# Patient Record
Sex: MALE | Race: WHITE | NOT HISPANIC OR LATINO | Employment: FULL TIME | URBAN - METROPOLITAN AREA
[De-identification: names, ages, dates, MRNs, and addresses within clinical notes are randomized per-mention and may not be internally consistent; named-entity substitution may affect disease eponyms.]

---

## 2024-03-06 ENCOUNTER — TRANSFERRED RECORDS (OUTPATIENT)
Dept: HEALTH INFORMATION MANAGEMENT | Facility: CLINIC | Age: 79
End: 2024-03-06
Payer: COMMERCIAL

## 2024-03-06 ENCOUNTER — MEDICAL CORRESPONDENCE (OUTPATIENT)
Dept: HEALTH INFORMATION MANAGEMENT | Facility: CLINIC | Age: 79
End: 2024-03-06
Payer: COMMERCIAL

## 2024-03-06 DIAGNOSIS — R06.02 SOB (SHORTNESS OF BREATH): ICD-10-CM

## 2024-03-06 DIAGNOSIS — Z01.818 PREOPERATIVE CLEARANCE: ICD-10-CM

## 2024-03-06 DIAGNOSIS — R01.1 NEWLY RECOGNIZED HEART MURMUR: Primary | ICD-10-CM

## 2024-03-06 DIAGNOSIS — F34.1 DYSTHYMIA: ICD-10-CM

## 2024-03-14 ENCOUNTER — HOSPITAL ENCOUNTER (OUTPATIENT)
Dept: CARDIOLOGY | Facility: CLINIC | Age: 79
Discharge: HOME | End: 2024-03-14
Attending: FAMILY MEDICINE | Admitting: FAMILY MEDICINE
Payer: COMMERCIAL

## 2024-03-14 DIAGNOSIS — R06.02 SOB (SHORTNESS OF BREATH): ICD-10-CM

## 2024-03-14 DIAGNOSIS — Z01.818 PREOPERATIVE CLEARANCE: ICD-10-CM

## 2024-03-14 DIAGNOSIS — R01.1 NEWLY RECOGNIZED HEART MURMUR: ICD-10-CM

## 2024-03-14 DIAGNOSIS — F34.1 DYSTHYMIA: ICD-10-CM

## 2024-03-14 PROCEDURE — 93018 CV STRESS TEST I&R ONLY: CPT | Performed by: INTERNAL MEDICINE

## 2024-03-14 PROCEDURE — 93321 DOPPLER ECHO F-UP/LMTD STD: CPT | Mod: 26 | Performed by: INTERNAL MEDICINE

## 2024-03-14 PROCEDURE — 93325 DOPPLER ECHO COLOR FLOW MAPG: CPT | Mod: 26 | Performed by: INTERNAL MEDICINE

## 2024-03-14 PROCEDURE — 93325 DOPPLER ECHO COLOR FLOW MAPG: CPT | Mod: TC

## 2024-03-14 PROCEDURE — 255N000002 HC RX 255 OP 636: Performed by: INTERNAL MEDICINE

## 2024-03-14 PROCEDURE — 93016 CV STRESS TEST SUPVJ ONLY: CPT | Performed by: INTERNAL MEDICINE

## 2024-03-14 PROCEDURE — 250N000009 HC RX 250: Performed by: INTERNAL MEDICINE

## 2024-03-14 PROCEDURE — 250N000011 HC RX IP 250 OP 636: Performed by: INTERNAL MEDICINE

## 2024-03-14 PROCEDURE — 93350 STRESS TTE ONLY: CPT | Mod: 26 | Performed by: INTERNAL MEDICINE

## 2024-03-14 RX ORDER — ATROPINE SULFATE 0.4 MG/ML
.2-2 AMPUL (ML) INJECTION
Status: DISCONTINUED | OUTPATIENT
Start: 2024-03-14 | End: 2024-03-15 | Stop reason: HOSPADM

## 2024-03-14 RX ORDER — METOPROLOL TARTRATE 1 MG/ML
1-20 INJECTION, SOLUTION INTRAVENOUS
Status: ACTIVE | OUTPATIENT
Start: 2024-03-14 | End: 2024-03-14

## 2024-03-14 RX ORDER — DOBUTAMINE HYDROCHLORIDE 200 MG/100ML
10-50 INJECTION INTRAVENOUS CONTINUOUS
Status: SHIPPED | OUTPATIENT
Start: 2024-03-14 | End: 2024-03-14

## 2024-03-14 RX ORDER — SODIUM CHLORIDE 9 MG/ML
INJECTION, SOLUTION INTRAVENOUS CONTINUOUS
Status: ACTIVE | OUTPATIENT
Start: 2024-03-14 | End: 2024-03-14

## 2024-03-14 RX ADMIN — METOPROLOL TARTRATE 2 MG: 5 INJECTION INTRAVENOUS at 08:33

## 2024-03-14 RX ADMIN — PERFLUTREN 6.5 ML: 6.52 INJECTION, SUSPENSION INTRAVENOUS at 08:33

## 2024-03-14 RX ADMIN — DOBUTAMINE IN DEXTROSE 10 MCG/KG/MIN: 200 INJECTION, SOLUTION INTRAVENOUS at 08:18

## 2024-03-14 NOTE — PROGRESS NOTES
Pt here for dobutamine stress test. Test, meds and side effects reviewed with patient. Achieved target HR at 40 mcg Dobutamine. Gave a total of 2 mg IV Metoprolol to bring HR back to baseline. Post monitoring complete and VSS. Pt escorted out to the gold waiting room.

## 2024-04-12 RX ORDER — MULTIPLE VITAMINS W/ MINERALS TAB 9MG-400MCG
1 TAB ORAL DAILY
COMMUNITY

## 2024-04-12 RX ORDER — PRAMIPEXOLE DIHYDROCHLORIDE 0.25 MG/1
0.25 TABLET ORAL AT BEDTIME
COMMUNITY

## 2024-04-12 RX ORDER — CALCIUM CARBONATE 500(1250)
1 TABLET ORAL DAILY
COMMUNITY
End: 2024-04-15

## 2024-04-12 RX ORDER — HYDROCHLOROTHIAZIDE 25 MG/1
25 TABLET ORAL DAILY
COMMUNITY

## 2024-04-12 RX ORDER — AMOXICILLIN 500 MG
4800 CAPSULE ORAL DAILY
COMMUNITY

## 2024-04-12 RX ORDER — LISINOPRIL 40 MG/1
40 TABLET ORAL DAILY
COMMUNITY

## 2024-04-12 RX ORDER — ASPIRIN 81 MG/1
81 TABLET ORAL DAILY
Status: ON HOLD | COMMUNITY
End: 2024-04-16

## 2024-04-12 RX ORDER — FLUTICASONE PROPIONATE 50 MCG
2 SPRAY, SUSPENSION (ML) NASAL AT BEDTIME
COMMUNITY

## 2024-04-12 RX ORDER — LATANOPROST 50 UG/ML
1 SOLUTION/ DROPS OPHTHALMIC AT BEDTIME
COMMUNITY

## 2024-04-12 RX ORDER — MELOXICAM 7.5 MG/1
7.5 TABLET ORAL 2 TIMES DAILY
Status: ON HOLD | COMMUNITY
End: 2024-04-16

## 2024-04-15 ENCOUNTER — ANESTHESIA EVENT (OUTPATIENT)
Dept: SURGERY | Facility: CLINIC | Age: 79
End: 2024-04-15
Payer: COMMERCIAL

## 2024-04-15 RX ORDER — LEVOTHYROXINE SODIUM 50 UG/1
1 TABLET ORAL
COMMUNITY
Start: 2024-01-01 | End: 2024-04-15

## 2024-04-15 NOTE — PROGRESS NOTES
PTA medications updated by Medication Scribe prior to surgery via phone call with patient (last doses completed by Nurse)     Medication history sources: Patient and H&P  In the past week, patient estimated taking medication this percent of the time: Greater than 90%      Significant changes made to the medication list:  Patient reports no longer taking the following meds (med scribe removed from PTA med list): Os-DEIDRE, Depakote      Additional medication history information:   Patient was advised to bring: Flonase nasal spray, latanoprost OP Soln.    Medication reconciliation completed by provider prior to medication history? No    Time spent in this activity: 40 minutes    The information provided in this note is only as accurate as the sources available at the time of update(s)      Prior to Admission medications    Medication Sig Last Dose Taking? Auth Provider Long Term End Date   aspirin 81 MG EC tablet Take 81 mg by mouth daily 4/9/2024 at am Yes Reported, Patient     ATORVASTATIN CALCIUM PO Take 20 mg by mouth at bedtime 4/15/2024 at PM Yes Reported, Patient     Calcium-Magnesium-Zinc 333-133-5 MG TABS per tablet Take 1 tablet by mouth daily 4/9/2024 at am Yes Reported, Patient     fluticasone (FLONASE) 50 MCG/ACT nasal spray Spray 2 sprays into both nostrils at bedtime 4/15/2024 at PM Yes Reported, Patient     hydrochlorothiazide (HYDRODIURIL) 25 MG tablet Take 25 mg by mouth daily 4/15/2024 at am Yes Reported, Patient Yes    latanoprost (XALATAN) 0.005 % ophthalmic solution Place 1 drop into both eyes at bedtime 4/15/2024 at pm Yes Reported, Patient Yes    Levothyroxine Sodium (SYNTHROID PO) Take 50 mcg by mouth daily  at am Yes Reported, Patient     lisinopril (ZESTRIL) 40 MG tablet Take 40 mg by mouth daily 4/15/2024 at am Yes Reported, Patient Yes    meloxicam (MOBIC) 7.5 MG tablet Take 7.5 mg by mouth 2 times daily 4/9/2024 at AM Yes Reported, Patient Yes    Multiple Vitamins-Minerals (PRESERVISION  AREDS 2 PO) Take 2 tablets by mouth daily 4/15/2024 at am Yes Reported, Patient     multivitamin w/minerals (THERA-VIT-M) tablet Take 1 tablet by mouth daily 4/9/2024 at am Yes Reported, Patient     Omega-3 Fatty Acids (FISH OIL) 1200 MG capsule Take 4,800 mg by mouth daily 4/9/2024 at PM Yes Reported, Patient     pramipexole (MIRAPEX) 0.25 MG tablet Take 0.25 mg by mouth at bedtime 4/15/2024 at pm Yes Reported, Patient Yes        Medication history completed by: Gisela Bee LPN

## 2024-04-16 ENCOUNTER — ANESTHESIA (OUTPATIENT)
Dept: SURGERY | Facility: CLINIC | Age: 79
End: 2024-04-16
Payer: COMMERCIAL

## 2024-04-16 ENCOUNTER — HOSPITAL ENCOUNTER (OUTPATIENT)
Facility: CLINIC | Age: 79
Discharge: HOME | End: 2024-04-17
Attending: ORTHOPAEDIC SURGERY | Admitting: ORTHOPAEDIC SURGERY
Payer: COMMERCIAL

## 2024-04-16 ENCOUNTER — APPOINTMENT (OUTPATIENT)
Dept: GENERAL RADIOLOGY | Facility: CLINIC | Age: 79
End: 2024-04-16
Attending: ORTHOPAEDIC SURGERY
Payer: COMMERCIAL

## 2024-04-16 ENCOUNTER — APPOINTMENT (OUTPATIENT)
Dept: PHYSICAL THERAPY | Facility: CLINIC | Age: 79
End: 2024-04-16
Attending: ORTHOPAEDIC SURGERY
Payer: COMMERCIAL

## 2024-04-16 DIAGNOSIS — R33.9 URINARY RETENTION: ICD-10-CM

## 2024-04-16 DIAGNOSIS — Z96.651 S/P TOTAL KNEE ARTHROPLASTY, RIGHT: Primary | ICD-10-CM

## 2024-04-16 LAB
CREAT SERPL-MCNC: 1.07 MG/DL (ref 0.67–1.17)
EGFRCR SERPLBLD CKD-EPI 2021: 71 ML/MIN/1.73M2
FASTING STATUS PATIENT QL REPORTED: YES
GLUCOSE SERPL-MCNC: 105 MG/DL (ref 70–99)
POTASSIUM SERPL-SCNC: 4.1 MMOL/L (ref 3.4–5.3)

## 2024-04-16 PROCEDURE — 999N000065 XR KNEE PORT RIGHT 1/2 VIEWS: Mod: RT

## 2024-04-16 PROCEDURE — 272N000001 HC OR GENERAL SUPPLY STERILE: Performed by: ORTHOPAEDIC SURGERY

## 2024-04-16 PROCEDURE — 250N000011 HC RX IP 250 OP 636: Performed by: ORTHOPAEDIC SURGERY

## 2024-04-16 PROCEDURE — 250N000009 HC RX 250: Performed by: NURSE ANESTHETIST, CERTIFIED REGISTERED

## 2024-04-16 PROCEDURE — 36415 COLL VENOUS BLD VENIPUNCTURE: CPT | Performed by: ANESTHESIOLOGY

## 2024-04-16 PROCEDURE — 82947 ASSAY GLUCOSE BLOOD QUANT: CPT | Mod: 91 | Performed by: ANESTHESIOLOGY

## 2024-04-16 PROCEDURE — 84132 ASSAY OF SERUM POTASSIUM: CPT | Performed by: ANESTHESIOLOGY

## 2024-04-16 PROCEDURE — 250N000013 HC RX MED GY IP 250 OP 250 PS 637: Performed by: PHYSICIAN ASSISTANT

## 2024-04-16 PROCEDURE — 258N000003 HC RX IP 258 OP 636: Performed by: PHYSICIAN ASSISTANT

## 2024-04-16 PROCEDURE — 27447 TOTAL KNEE ARTHROPLASTY: CPT | Performed by: ANESTHESIOLOGY

## 2024-04-16 PROCEDURE — 99100 ANES PT EXTEME AGE<1 YR&>70: CPT | Performed by: NURSE ANESTHETIST, CERTIFIED REGISTERED

## 2024-04-16 PROCEDURE — C1713 ANCHOR/SCREW BN/BN,TIS/BN: HCPCS | Performed by: ORTHOPAEDIC SURGERY

## 2024-04-16 PROCEDURE — 710N000009 HC RECOVERY PHASE 1, LEVEL 1, PER MIN: Performed by: ORTHOPAEDIC SURGERY

## 2024-04-16 PROCEDURE — 250N000011 HC RX IP 250 OP 636: Performed by: PHYSICIAN ASSISTANT

## 2024-04-16 PROCEDURE — 250N000009 HC RX 250: Performed by: ORTHOPAEDIC SURGERY

## 2024-04-16 PROCEDURE — C1776 JOINT DEVICE (IMPLANTABLE): HCPCS | Performed by: ORTHOPAEDIC SURGERY

## 2024-04-16 PROCEDURE — 250N000009 HC RX 250: Performed by: ANESTHESIOLOGY

## 2024-04-16 PROCEDURE — 97530 THERAPEUTIC ACTIVITIES: CPT | Mod: GP

## 2024-04-16 PROCEDURE — 250N000011 HC RX IP 250 OP 636: Performed by: NURSE ANESTHETIST, CERTIFIED REGISTERED

## 2024-04-16 PROCEDURE — 370N000017 HC ANESTHESIA TECHNICAL FEE, PER MIN: Performed by: ORTHOPAEDIC SURGERY

## 2024-04-16 PROCEDURE — 258N000003 HC RX IP 258 OP 636: Performed by: NURSE ANESTHETIST, CERTIFIED REGISTERED

## 2024-04-16 PROCEDURE — 258N000003 HC RX IP 258 OP 636: Performed by: ANESTHESIOLOGY

## 2024-04-16 PROCEDURE — 258N000001 HC RX 258: Performed by: ORTHOPAEDIC SURGERY

## 2024-04-16 PROCEDURE — 258N000003 HC RX IP 258 OP 636: Performed by: ORTHOPAEDIC SURGERY

## 2024-04-16 PROCEDURE — 97161 PT EVAL LOW COMPLEX 20 MIN: CPT | Mod: GP

## 2024-04-16 PROCEDURE — 99205 OFFICE O/P NEW HI 60 MIN: CPT | Performed by: HOSPITALIST

## 2024-04-16 PROCEDURE — 27447 TOTAL KNEE ARTHROPLASTY: CPT | Performed by: NURSE ANESTHETIST, CERTIFIED REGISTERED

## 2024-04-16 PROCEDURE — 360N000077 HC SURGERY LEVEL 4, PER MIN: Performed by: ORTHOPAEDIC SURGERY

## 2024-04-16 PROCEDURE — 97116 GAIT TRAINING THERAPY: CPT | Mod: GP

## 2024-04-16 PROCEDURE — 82565 ASSAY OF CREATININE: CPT | Performed by: ORTHOPAEDIC SURGERY

## 2024-04-16 PROCEDURE — 999N000141 HC STATISTIC PRE-PROCEDURE NURSING ASSESSMENT: Performed by: ORTHOPAEDIC SURGERY

## 2024-04-16 PROCEDURE — 250N000013 HC RX MED GY IP 250 OP 250 PS 637: Performed by: ORTHOPAEDIC SURGERY

## 2024-04-16 DEVICE — SIMPLEX® HV IS A FAST-SETTING ACRYLIC RESIN FOR USE IN BONE SURGERY. MIXING THE TWO SEPARATE STERILE COMPONENTS PRODUCES A DUCTILE BONE CEMENT WHICH, AFTER HARDENING, FIXES THE IMPLANT AND TRANSFERS STRESSES PRODUCED DURING MOVEMENT EVENLY TO THE BONE. SIMPLEX® HV CEMENT POWDER ALSO CONTAINS INSOLUBLE ZIRCONIUM DIOXIDE AS AN X-RAY CONTRAST MEDIUM. SIMPLEX® HV DOES NOT EMIT A SIGNAL AND DOES NOT POSE A SAFETY RISK IN A MAGNETIC RESONANCE ENVIRONMENT.
Type: IMPLANTABLE DEVICE | Site: KNEE | Status: FUNCTIONAL
Brand: SIMPLEX HV

## 2024-04-16 DEVICE — LEGION POSTERIOR STABILIZED HIGH                                    FLEX HIGHLY CROSS LINKED                                    POLYETHYLENE SIZE 7-8 11MM
Type: IMPLANTABLE DEVICE | Site: KNEE | Status: FUNCTIONAL
Brand: LEGION

## 2024-04-16 DEVICE — GEN II RESURFACING PATELLA 38MM
Type: IMPLANTABLE DEVICE | Site: KNEE | Status: FUNCTIONAL
Brand: GENESIS II

## 2024-04-16 DEVICE — GENESIS II NON-POROUS TIBIAL                                    BASEPLATE SIZE 7 RIGHT
Type: IMPLANTABLE DEVICE | Site: KNEE | Status: FUNCTIONAL
Brand: GENESIS II

## 2024-04-16 DEVICE — LEGION POSTERIOR STABILIZED                                    NONPOROUS FEMORAL SIZE 7 RIGHT
Type: IMPLANTABLE DEVICE | Site: KNEE | Status: FUNCTIONAL
Brand: LEGION

## 2024-04-16 RX ORDER — PRAMIPEXOLE DIHYDROCHLORIDE 0.25 MG/1
0.25 TABLET ORAL AT BEDTIME
Status: DISCONTINUED | OUTPATIENT
Start: 2024-04-16 | End: 2024-04-17 | Stop reason: HOSPADM

## 2024-04-16 RX ORDER — NALOXONE HYDROCHLORIDE 0.4 MG/ML
0.4 INJECTION, SOLUTION INTRAMUSCULAR; INTRAVENOUS; SUBCUTANEOUS
Status: DISCONTINUED | OUTPATIENT
Start: 2024-04-16 | End: 2024-04-17 | Stop reason: HOSPADM

## 2024-04-16 RX ORDER — HYDROMORPHONE HYDROCHLORIDE 2 MG/1
4 TABLET ORAL EVERY 4 HOURS PRN
Status: DISCONTINUED | OUTPATIENT
Start: 2024-04-16 | End: 2024-04-17 | Stop reason: HOSPADM

## 2024-04-16 RX ORDER — FLUTICASONE PROPIONATE 50 MCG
2 SPRAY, SUSPENSION (ML) NASAL AT BEDTIME
Status: DISCONTINUED | OUTPATIENT
Start: 2024-04-16 | End: 2024-04-17 | Stop reason: HOSPADM

## 2024-04-16 RX ORDER — GLYCOPYRROLATE 0.2 MG/ML
INJECTION, SOLUTION INTRAMUSCULAR; INTRAVENOUS PRN
Status: DISCONTINUED | OUTPATIENT
Start: 2024-04-16 | End: 2024-04-16

## 2024-04-16 RX ORDER — HYDRALAZINE HYDROCHLORIDE 20 MG/ML
2.5-5 INJECTION INTRAMUSCULAR; INTRAVENOUS
Status: DISCONTINUED | OUTPATIENT
Start: 2024-04-16 | End: 2024-04-16 | Stop reason: HOSPADM

## 2024-04-16 RX ORDER — HYDROMORPHONE HCL IN WATER/PF 6 MG/30 ML
0.2 PATIENT CONTROLLED ANALGESIA SYRINGE INTRAVENOUS EVERY 5 MIN PRN
Status: DISCONTINUED | OUTPATIENT
Start: 2024-04-16 | End: 2024-04-16 | Stop reason: HOSPADM

## 2024-04-16 RX ORDER — PROPOFOL 10 MG/ML
INJECTION, EMULSION INTRAVENOUS CONTINUOUS PRN
Status: DISCONTINUED | OUTPATIENT
Start: 2024-04-16 | End: 2024-04-16

## 2024-04-16 RX ORDER — BISACODYL 10 MG
10 SUPPOSITORY, RECTAL RECTAL DAILY PRN
Status: DISCONTINUED | OUTPATIENT
Start: 2024-04-19 | End: 2024-04-17 | Stop reason: HOSPADM

## 2024-04-16 RX ORDER — HYDROCHLOROTHIAZIDE 25 MG/1
25 TABLET ORAL DAILY
Status: DISCONTINUED | OUTPATIENT
Start: 2024-04-17 | End: 2024-04-17 | Stop reason: HOSPADM

## 2024-04-16 RX ORDER — ASPIRIN 81 MG/1
81 TABLET ORAL 2 TIMES DAILY
Status: DISCONTINUED | OUTPATIENT
Start: 2024-04-16 | End: 2024-04-17 | Stop reason: HOSPADM

## 2024-04-16 RX ORDER — HYDROMORPHONE HYDROCHLORIDE 2 MG/1
2 TABLET ORAL EVERY 4 HOURS PRN
Status: DISCONTINUED | OUTPATIENT
Start: 2024-04-16 | End: 2024-04-17 | Stop reason: HOSPADM

## 2024-04-16 RX ORDER — AMOXICILLIN 250 MG
1-2 CAPSULE ORAL 2 TIMES DAILY
Qty: 30 TABLET | Refills: 0 | Status: SHIPPED | OUTPATIENT
Start: 2024-04-16 | End: 2024-05-17

## 2024-04-16 RX ORDER — ONDANSETRON 4 MG/1
4 TABLET, ORALLY DISINTEGRATING ORAL EVERY 6 HOURS PRN
Status: DISCONTINUED | OUTPATIENT
Start: 2024-04-16 | End: 2024-04-17 | Stop reason: HOSPADM

## 2024-04-16 RX ORDER — SODIUM CHLORIDE, SODIUM LACTATE, POTASSIUM CHLORIDE, CALCIUM CHLORIDE 600; 310; 30; 20 MG/100ML; MG/100ML; MG/100ML; MG/100ML
INJECTION, SOLUTION INTRAVENOUS CONTINUOUS
Status: DISCONTINUED | OUTPATIENT
Start: 2024-04-16 | End: 2024-04-16 | Stop reason: HOSPADM

## 2024-04-16 RX ORDER — ONDANSETRON 2 MG/ML
4 INJECTION INTRAMUSCULAR; INTRAVENOUS EVERY 6 HOURS PRN
Status: DISCONTINUED | OUTPATIENT
Start: 2024-04-16 | End: 2024-04-17 | Stop reason: HOSPADM

## 2024-04-16 RX ORDER — ATORVASTATIN CALCIUM 20 MG/1
20 TABLET, FILM COATED ORAL AT BEDTIME
Status: DISCONTINUED | OUTPATIENT
Start: 2024-04-16 | End: 2024-04-17 | Stop reason: HOSPADM

## 2024-04-16 RX ORDER — FENTANYL CITRATE 0.05 MG/ML
25 INJECTION, SOLUTION INTRAMUSCULAR; INTRAVENOUS EVERY 5 MIN PRN
Status: DISCONTINUED | OUTPATIENT
Start: 2024-04-16 | End: 2024-04-16 | Stop reason: HOSPADM

## 2024-04-16 RX ORDER — LIDOCAINE 40 MG/G
CREAM TOPICAL
Status: DISCONTINUED | OUTPATIENT
Start: 2024-04-16 | End: 2024-04-17 | Stop reason: HOSPADM

## 2024-04-16 RX ORDER — DEXAMETHASONE SODIUM PHOSPHATE 4 MG/ML
INJECTION, SOLUTION INTRA-ARTICULAR; INTRALESIONAL; INTRAMUSCULAR; INTRAVENOUS; SOFT TISSUE PRN
Status: DISCONTINUED | OUTPATIENT
Start: 2024-04-16 | End: 2024-04-16

## 2024-04-16 RX ORDER — MAGNESIUM HYDROXIDE 1200 MG/15ML
LIQUID ORAL PRN
Status: DISCONTINUED | OUTPATIENT
Start: 2024-04-16 | End: 2024-04-16 | Stop reason: HOSPADM

## 2024-04-16 RX ORDER — ACETAMINOPHEN 325 MG/1
650 TABLET ORAL EVERY 4 HOURS PRN
Status: DISCONTINUED | OUTPATIENT
Start: 2024-04-19 | End: 2024-04-17 | Stop reason: HOSPADM

## 2024-04-16 RX ORDER — CEFAZOLIN SODIUM/WATER 3 G/30 ML
3 SYRINGE (ML) INTRAVENOUS
Status: COMPLETED | OUTPATIENT
Start: 2024-04-16 | End: 2024-04-16

## 2024-04-16 RX ORDER — NALOXONE HYDROCHLORIDE 0.4 MG/ML
0.2 INJECTION, SOLUTION INTRAMUSCULAR; INTRAVENOUS; SUBCUTANEOUS
Status: DISCONTINUED | OUTPATIENT
Start: 2024-04-16 | End: 2024-04-17 | Stop reason: HOSPADM

## 2024-04-16 RX ORDER — TRANEXAMIC ACID 650 MG/1
1950 TABLET ORAL ONCE
Status: COMPLETED | OUTPATIENT
Start: 2024-04-16 | End: 2024-04-16

## 2024-04-16 RX ORDER — LABETALOL HYDROCHLORIDE 5 MG/ML
5 INJECTION, SOLUTION INTRAVENOUS
Status: DISCONTINUED | OUTPATIENT
Start: 2024-04-16 | End: 2024-04-16 | Stop reason: HOSPADM

## 2024-04-16 RX ORDER — ONDANSETRON 4 MG/1
4 TABLET, ORALLY DISINTEGRATING ORAL EVERY 30 MIN PRN
Status: DISCONTINUED | OUTPATIENT
Start: 2024-04-16 | End: 2024-04-16 | Stop reason: HOSPADM

## 2024-04-16 RX ORDER — ONDANSETRON 2 MG/ML
INJECTION INTRAMUSCULAR; INTRAVENOUS PRN
Status: DISCONTINUED | OUTPATIENT
Start: 2024-04-16 | End: 2024-04-16

## 2024-04-16 RX ORDER — BUPIVACAINE HYDROCHLORIDE 7.5 MG/ML
INJECTION, SOLUTION INTRASPINAL PRN
Status: DISCONTINUED | OUTPATIENT
Start: 2024-04-16 | End: 2024-04-16

## 2024-04-16 RX ORDER — LISINOPRIL 40 MG/1
40 TABLET ORAL DAILY
Status: DISCONTINUED | OUTPATIENT
Start: 2024-04-16 | End: 2024-04-16

## 2024-04-16 RX ORDER — NALOXONE HYDROCHLORIDE 0.4 MG/ML
0.1 INJECTION, SOLUTION INTRAMUSCULAR; INTRAVENOUS; SUBCUTANEOUS
Status: DISCONTINUED | OUTPATIENT
Start: 2024-04-16 | End: 2024-04-16 | Stop reason: HOSPADM

## 2024-04-16 RX ORDER — FENTANYL CITRATE 0.05 MG/ML
50 INJECTION, SOLUTION INTRAMUSCULAR; INTRAVENOUS EVERY 5 MIN PRN
Status: DISCONTINUED | OUTPATIENT
Start: 2024-04-16 | End: 2024-04-16 | Stop reason: HOSPADM

## 2024-04-16 RX ORDER — HYDROMORPHONE HCL IN WATER/PF 6 MG/30 ML
0.4 PATIENT CONTROLLED ANALGESIA SYRINGE INTRAVENOUS
Status: DISCONTINUED | OUTPATIENT
Start: 2024-04-16 | End: 2024-04-17 | Stop reason: HOSPADM

## 2024-04-16 RX ORDER — AMOXICILLIN 250 MG
1 CAPSULE ORAL 2 TIMES DAILY
Status: DISCONTINUED | OUTPATIENT
Start: 2024-04-16 | End: 2024-04-17 | Stop reason: HOSPADM

## 2024-04-16 RX ORDER — LATANOPROST 50 UG/ML
1 SOLUTION/ DROPS OPHTHALMIC AT BEDTIME
Status: DISCONTINUED | OUTPATIENT
Start: 2024-04-16 | End: 2024-04-17 | Stop reason: HOSPADM

## 2024-04-16 RX ORDER — HYDROXYZINE HYDROCHLORIDE 10 MG/1
10 TABLET, FILM COATED ORAL EVERY 6 HOURS PRN
Status: DISCONTINUED | OUTPATIENT
Start: 2024-04-16 | End: 2024-04-17 | Stop reason: HOSPADM

## 2024-04-16 RX ORDER — HYDROMORPHONE HCL IN WATER/PF 6 MG/30 ML
0.4 PATIENT CONTROLLED ANALGESIA SYRINGE INTRAVENOUS EVERY 5 MIN PRN
Status: DISCONTINUED | OUTPATIENT
Start: 2024-04-16 | End: 2024-04-16 | Stop reason: HOSPADM

## 2024-04-16 RX ORDER — HYDROMORPHONE HYDROCHLORIDE 2 MG/1
2-4 TABLET ORAL EVERY 4 HOURS PRN
Qty: 31 TABLET | Refills: 0 | Status: SHIPPED | OUTPATIENT
Start: 2024-04-16 | End: 2024-05-17

## 2024-04-16 RX ORDER — HYDROCHLOROTHIAZIDE 25 MG/1
25 TABLET ORAL DAILY
Status: DISCONTINUED | OUTPATIENT
Start: 2024-04-16 | End: 2024-04-16

## 2024-04-16 RX ORDER — ONDANSETRON 2 MG/ML
4 INJECTION INTRAMUSCULAR; INTRAVENOUS EVERY 30 MIN PRN
Status: DISCONTINUED | OUTPATIENT
Start: 2024-04-16 | End: 2024-04-16 | Stop reason: HOSPADM

## 2024-04-16 RX ORDER — PROPOFOL 10 MG/ML
INJECTION, EMULSION INTRAVENOUS PRN
Status: DISCONTINUED | OUTPATIENT
Start: 2024-04-16 | End: 2024-04-16

## 2024-04-16 RX ORDER — CEFAZOLIN SODIUM 2 G/100ML
2 INJECTION, SOLUTION INTRAVENOUS EVERY 8 HOURS
Qty: 200 ML | Refills: 0 | Status: COMPLETED | OUTPATIENT
Start: 2024-04-16 | End: 2024-04-17

## 2024-04-16 RX ORDER — LEVOTHYROXINE SODIUM 50 UG/1
50 TABLET ORAL DAILY
Status: DISCONTINUED | OUTPATIENT
Start: 2024-04-17 | End: 2024-04-17 | Stop reason: HOSPADM

## 2024-04-16 RX ORDER — ASPIRIN 81 MG/1
81 TABLET ORAL 2 TIMES DAILY
Qty: 60 TABLET | Refills: 0 | Status: SHIPPED | OUTPATIENT
Start: 2024-04-16

## 2024-04-16 RX ORDER — PROCHLORPERAZINE MALEATE 5 MG
5 TABLET ORAL EVERY 6 HOURS PRN
Status: DISCONTINUED | OUTPATIENT
Start: 2024-04-16 | End: 2024-04-17 | Stop reason: HOSPADM

## 2024-04-16 RX ORDER — SODIUM CHLORIDE, SODIUM LACTATE, POTASSIUM CHLORIDE, CALCIUM CHLORIDE 600; 310; 30; 20 MG/100ML; MG/100ML; MG/100ML; MG/100ML
INJECTION, SOLUTION INTRAVENOUS CONTINUOUS
Status: DISCONTINUED | OUTPATIENT
Start: 2024-04-16 | End: 2024-04-17 | Stop reason: HOSPADM

## 2024-04-16 RX ORDER — LISINOPRIL 40 MG/1
40 TABLET ORAL DAILY
Status: DISCONTINUED | OUTPATIENT
Start: 2024-04-17 | End: 2024-04-17 | Stop reason: HOSPADM

## 2024-04-16 RX ORDER — ACETAMINOPHEN 325 MG/1
975 TABLET ORAL EVERY 8 HOURS
Status: DISCONTINUED | OUTPATIENT
Start: 2024-04-16 | End: 2024-04-17 | Stop reason: HOSPADM

## 2024-04-16 RX ORDER — POLYETHYLENE GLYCOL 3350 17 G/17G
17 POWDER, FOR SOLUTION ORAL DAILY
Status: DISCONTINUED | OUTPATIENT
Start: 2024-04-17 | End: 2024-04-17 | Stop reason: HOSPADM

## 2024-04-16 RX ORDER — CEFAZOLIN SODIUM/WATER 3 G/30 ML
3 SYRINGE (ML) INTRAVENOUS SEE ADMIN INSTRUCTIONS
Status: DISCONTINUED | OUTPATIENT
Start: 2024-04-16 | End: 2024-04-16 | Stop reason: HOSPADM

## 2024-04-16 RX ORDER — LIDOCAINE HYDROCHLORIDE 20 MG/ML
INJECTION, SOLUTION INFILTRATION; PERINEURAL PRN
Status: DISCONTINUED | OUTPATIENT
Start: 2024-04-16 | End: 2024-04-16

## 2024-04-16 RX ORDER — ACETAMINOPHEN 325 MG/1
650 TABLET ORAL EVERY 4 HOURS PRN
Qty: 100 TABLET | Refills: 0 | Status: SHIPPED | OUTPATIENT
Start: 2024-04-16 | End: 2024-05-17

## 2024-04-16 RX ORDER — HYDROMORPHONE HCL IN WATER/PF 6 MG/30 ML
0.2 PATIENT CONTROLLED ANALGESIA SYRINGE INTRAVENOUS
Status: DISCONTINUED | OUTPATIENT
Start: 2024-04-16 | End: 2024-04-17 | Stop reason: HOSPADM

## 2024-04-16 RX ADMIN — GLYCOPYRROLATE 0.2 MG: 0.2 INJECTION, SOLUTION INTRAMUSCULAR; INTRAVENOUS at 09:45

## 2024-04-16 RX ADMIN — ONDANSETRON 4 MG: 2 INJECTION INTRAMUSCULAR; INTRAVENOUS at 11:17

## 2024-04-16 RX ADMIN — BUPIVACAINE HYDROCHLORIDE 15 ML: 5 INJECTION, SOLUTION PERINEURAL at 08:41

## 2024-04-16 RX ADMIN — HYDROMORPHONE HYDROCHLORIDE 2 MG: 2 TABLET ORAL at 18:15

## 2024-04-16 RX ADMIN — HYDROXYZINE HYDROCHLORIDE 10 MG: 10 TABLET ORAL at 18:15

## 2024-04-16 RX ADMIN — PHENYLEPHRINE HYDROCHLORIDE 100 MCG: 10 INJECTION INTRAVENOUS at 10:08

## 2024-04-16 RX ADMIN — ATORVASTATIN CALCIUM 20 MG: 20 TABLET, FILM COATED ORAL at 21:34

## 2024-04-16 RX ADMIN — SENNOSIDES AND DOCUSATE SODIUM 1 TABLET: 50; 8.6 TABLET ORAL at 14:19

## 2024-04-16 RX ADMIN — DEXAMETHASONE SODIUM PHOSPHATE 10 MG: 4 INJECTION, SOLUTION INTRA-ARTICULAR; INTRALESIONAL; INTRAMUSCULAR; INTRAVENOUS; SOFT TISSUE at 09:29

## 2024-04-16 RX ADMIN — ASPIRIN 81 MG: 81 TABLET, COATED ORAL at 20:06

## 2024-04-16 RX ADMIN — SODIUM CHLORIDE, POTASSIUM CHLORIDE, SODIUM LACTATE AND CALCIUM CHLORIDE: 600; 310; 30; 20 INJECTION, SOLUTION INTRAVENOUS at 18:47

## 2024-04-16 RX ADMIN — ASPIRIN 81 MG: 81 TABLET, COATED ORAL at 14:19

## 2024-04-16 RX ADMIN — BUPIVACAINE HYDROCHLORIDE IN DEXTROSE 13.5 MG: 7.5 INJECTION, SOLUTION SUBARACHNOID at 09:19

## 2024-04-16 RX ADMIN — PROPOFOL 150 MCG/KG/MIN: 10 INJECTION, EMULSION INTRAVENOUS at 09:21

## 2024-04-16 RX ADMIN — TRANEXAMIC ACID 1950 MG: 650 TABLET ORAL at 07:29

## 2024-04-16 RX ADMIN — PHENYLEPHRINE HYDROCHLORIDE 100 MCG: 10 INJECTION INTRAVENOUS at 11:08

## 2024-04-16 RX ADMIN — ACETAMINOPHEN 975 MG: 325 TABLET, FILM COATED ORAL at 14:19

## 2024-04-16 RX ADMIN — PROPOFOL 20 MG: 10 INJECTION, EMULSION INTRAVENOUS at 09:17

## 2024-04-16 RX ADMIN — PRAMIPEXOLE DIHYDROCHLORIDE 0.25 MG: 0.25 TABLET ORAL at 20:06

## 2024-04-16 RX ADMIN — Medication 3 G: at 09:17

## 2024-04-16 RX ADMIN — PHENYLEPHRINE HYDROCHLORIDE 100 MCG: 10 INJECTION INTRAVENOUS at 09:55

## 2024-04-16 RX ADMIN — SENNOSIDES AND DOCUSATE SODIUM 1 TABLET: 50; 8.6 TABLET ORAL at 20:06

## 2024-04-16 RX ADMIN — Medication 2 SPRAY: at 20:19

## 2024-04-16 RX ADMIN — PHENYLEPHRINE HYDROCHLORIDE 100 MCG: 10 INJECTION INTRAVENOUS at 10:22

## 2024-04-16 RX ADMIN — LATANOPROST 1 DROP: 50 SOLUTION/ DROPS OPHTHALMIC at 20:18

## 2024-04-16 RX ADMIN — ACETAMINOPHEN 975 MG: 325 TABLET, FILM COATED ORAL at 20:06

## 2024-04-16 RX ADMIN — PROPOFOL 20 MG: 10 INJECTION, EMULSION INTRAVENOUS at 09:22

## 2024-04-16 RX ADMIN — SODIUM CHLORIDE, POTASSIUM CHLORIDE, SODIUM LACTATE AND CALCIUM CHLORIDE: 600; 310; 30; 20 INJECTION, SOLUTION INTRAVENOUS at 10:09

## 2024-04-16 RX ADMIN — CEFAZOLIN SODIUM 2 G: 2 INJECTION, SOLUTION INTRAVENOUS at 16:58

## 2024-04-16 RX ADMIN — SODIUM CHLORIDE, POTASSIUM CHLORIDE, SODIUM LACTATE AND CALCIUM CHLORIDE: 600; 310; 30; 20 INJECTION, SOLUTION INTRAVENOUS at 07:28

## 2024-04-16 RX ADMIN — LIDOCAINE HYDROCHLORIDE 40 MG: 20 INJECTION, SOLUTION INFILTRATION; PERINEURAL at 09:17

## 2024-04-16 ASSESSMENT — LIFESTYLE VARIABLES: TOBACCO_USE: 0

## 2024-04-16 ASSESSMENT — ACTIVITIES OF DAILY LIVING (ADL)
ADLS_ACUITY_SCORE: 25
ADLS_ACUITY_SCORE: 24
ADLS_ACUITY_SCORE: 25
ADLS_ACUITY_SCORE: 24
ADLS_ACUITY_SCORE: 25
ADLS_ACUITY_SCORE: 24
ADLS_ACUITY_SCORE: 22
ADLS_ACUITY_SCORE: 24
ADLS_ACUITY_SCORE: 22
ADLS_ACUITY_SCORE: 25
ADLS_ACUITY_SCORE: 22
ADLS_ACUITY_SCORE: 35

## 2024-04-16 ASSESSMENT — ENCOUNTER SYMPTOMS
SEIZURES: 0
DYSRHYTHMIAS: 0

## 2024-04-16 ASSESSMENT — COPD QUESTIONNAIRES: COPD: 0

## 2024-04-16 NOTE — ANESTHESIA PROCEDURE NOTES
Adductor canal Procedure Note    Pre-Procedure   Staff -        Anesthesiologist:  Matt Neff MD       Performed By: Anesthesiologist       Location: pre-op       Pre-Anesthestic Checklist: patient identified, IV checked, site marked, risks and benefits discussed, informed consent, monitors and equipment checked, pre-op evaluation, at physician/surgeon's request and post-op pain management  Timeout:       Correct Patient: Yes        Correct Procedure: Yes        Correct Site: Yes        Correct Position: Yes        Correct Laterality: Yes        Site Marked: Yes  Procedure Documentation  Procedure: Adductor canal       Laterality: right       Patient Position: supine       Patient Prep/Sterile Barriers: sterile gloves, mask       Skin prep: Chloraprep       Local skin infiltrated with mL of 1% lidocaine.        Needle Type: insulated       Needle Gauge: 22.        Needle Length (Inches): 3.13        Ultrasound guided       1. Ultrasound was used to identify targeted nerve, plexus, vascular marker, or fascial plane and place a needle adjacent to it in real-time.       2. Ultrasound was used to visualize the spread of anesthetic in close proximity to the above referenced structure.       3. A permanent image is entered into the patient's record.       4. The visualized anatomic structures appeared normal.       5. There were no apparent abnormal pathologic findings.    Assessment/Narrative         The placement was negative for: blood aspirated, painful injection and site bleeding       Paresthesias: No.       Bolus given via needle..        Secured via.        Insertion/Infusion Method: Single Shot    Medication(s) Administered   Bupivacaine 0.5% w/ 1:400K Epi (Injection) - Injection   15 mL - 4/16/2024 8:41:00 AM   Comments:  Femoral artery clearly identified deep to the sartorius muscle via ultrasound imaging.  After appropriate timeout procedure, needle was advanced under direct ultrasound guidance.  15 ml  "of 0.5% bupivacaine with 1:400,000 epinephrine was incrementally injected with negative aspiration every 5 ml.  Patient tolerated procedure well.    Ultrasound Interpretation, peripheral nerve block    1.  As noted above, under ultrasound guidance, the needle was inserted and placed in close proximity to the saphenous nerve.  2. Ultrasound was also used to visualize the spread of the anesthetic in close proximity to the nerve being blocked.  3. The nerve appeared anatomically normal.  4. There were no apparent abnormal pathological findings.  5. A permanent ultrasound image was saved n the patient's record.    Matt Neff MD   3:25 PM        FOR [facility] ([facility] [place]) ONLY:   Pain Team Contact information: please page the Pain Team Via [facility]. Search \"Pain\". During daytime hours, please page the attending first. At night please page the resident first.      "

## 2024-04-16 NOTE — ANESTHESIA CARE TRANSFER NOTE
Patient: Sylvain Moss    Procedure: Procedure(s):  Right Total Knee Arthroplasty       Diagnosis: Osteoarthritis of right knee [M17.11]  Diagnosis Additional Information: No value filed.    Anesthesia Type:   Spinal     Note:    Oropharynx: nasal airway in place  Level of Consciousness: awake  Oxygen Supplementation: room air    Independent Airway: airway patency satisfactory and stable  Dentition: dentition unchanged  Vital Signs Stable: post-procedure vital signs reviewed and stable  Report to RN Given: handoff report given  Patient transferred to: PACU    Handoff Report: Identifed the Patient, Identified the Reponsible Provider, Reviewed the pertinent medical history, Discussed the surgical course, Reviewed Intra-OP anesthesia mangement and issues during anesthesia, Set expectations for post-procedure period and Allowed opportunity for questions and acknowledgement of understanding      Vitals:  Vitals Value Taken Time   /58    Temp     Pulse 70 04/16/24 1144   Resp 27 04/16/24 1144   SpO2 99 % 04/16/24 1143   Vitals shown include unfiled device data.    Electronically Signed By: KELLY Fonseca CRNA  April 16, 2024  11:44 AM

## 2024-04-16 NOTE — CONSULTS
[facility]  Consult Note - Hospitalist Service  Date of Admission:  4/16/2024  Consult Requested by: Dr. Gamboa  Reason for Consult: Medical management of chronic medical problems including hypertension, hypothyroidism, dyslipidemia    Assessment & Plan     Sylvain Moss is a 78 year old male who has medical history which includes hypertension, dyslipidemia, hypothyroidism, glucoma, restless leg syndrome, osteoarthritis of the left knee who underwent Right total knee arthroplasty and hospital medicine was consulted for management of chronic medical problems including hypertension, dyslipidemia.       Status post Right knee total arthroplasty on 4/16/2024  -EBL of 30 mL  -Continue with pain control, bowel regimen, physical therapy, Occupational Therapy and DVT prophylaxis per the orthopedic protocol  -Monitor daily hemoglobin  -Discharge planning per the primary team    Hypertension  Hyperlipidemia  -PTA Home medications include hydrochlorothiazide 25 mg daily, lisinopril 40 mg daily and atorvastatin 20 mg at bedtime  -We will continue with atorvastatin at bedtime and hold hydrochlorothiazide and lisinopril for  for 4/16 and start from 4/17 with holding parameter     Hypothyroidism  -We will continue with PTA levothyroxine    Glaucoma  -Continue with PTA latanoprost    Restless leg syndrome  -Continue with Mirapex 0.25 mg at bedtime and takes at around 2000 hrs and RN aware     History of prostate cancer  -Noted      He is okay not having his mvi and mvi for eye until he goes home    Family communication   -D/w wife and she had questions which were answered       Clinically Significant Risk Factors Present on Admission                # Drug Induced Platelet Defect: home medication list includes an antiplatelet medication   # Hypertension: Home medication list includes antihypertensive(s)      # Obesity: Estimated body mass index is 39.74 kg/m  as calculated from the following:    Height  as of this encounter: 1.829 m (6').    Weight as of this encounter: 132.9 kg (293 lb).              Kay Lambert MD  Hospitalist Service  Securely message with [employer] (more info)  Text page via [employer] Paging/Directory     Thank you for the consult and we will continue to follow    I am on admitting shift and his care will be continued to be followed by the hospital medicine team in the morning    ______________________________________________________________________    Chief Complaint     Hospital medicine team consulted for postoperative management of hypertension, hyperlipidemia and hypothyroidism status post Right total knee arthroplasty on 4/16/2024    History is obtained from the patient    History of Present Illness   Sylvain Moss is a 78 year old male who has medical history which includes hypertension, dyslipidemia, hypothyroidism, glucoma, restless leg syndrome, osteoarthritis of the left knee who underwent Right total knee arthroplasty and hospital medicine was consulted for management of chronic medical problems including hypertension, dyslipidemia.  Patient was seen postoperatively in room 2/3/2003 and was clinically doing well and local pain is well-controlled and denied any shortness of breath, nausea, vomiting and I did verify which medications he took on day of procedure. He only took levothyroxine before surgery     He also said he takes mirapex at 8 pm       Past Medical History    Past Medical History:   Diagnosis Date    Bipolar affective disorder (H)     Elevated serum cholesterol     Hypertension     Lipoma of skin and subcutaneous tissue     Nocturia     Obese     Prostate cancer (H)     Thyroid disease        Past Surgical History   Past Surgical History:   Procedure Laterality Date    ANKLE SURGERY Right     ARTHROSCOPY KNEE BILATERAL Bilateral        Medications         Did review his medications from the recent medication list done by the scribe on 4/15    Physical Exam   Vital Signs:  Temp: 97.7  F (36.5  C) Temp src: Oral BP: 131/74 Pulse: 58   Resp: 16 SpO2: 98 % O2 Device: None (Room air) Oxygen Delivery: 2 LPM  Weight: 293 lbs 0 oz        General: aox3 eating lunch  HEENT: Head is atraumatic, normocephalic.    Neck: Neck is supple   Respiratory: Lungs are clear to auscultation bilaterally with no wheeze or crackles   Cardiovascular: Regular rate , S1 and S2 normal with no murmer or rubs or gallops  Abdomen:   soft , non tender , non distended   Neurologic: Higher functions are within normal limits. No obvious defects in speech, language and memory. No facial droop  Musculoskeletal: Normal Range of motion over upper and lower extremities bilaterally and decreased over the Right lower extremity because of recent operative intervention and dressing in place  Psychiatric: cooperative      Medical Decision Making       Time spent in care of patient is 60 minutes and I reviewed his medications and discussed plan of care in detail with the patient, nursing staff and also with wife  and he had questions which answered to satisfaction      Data     I have personally reviewed the following data over the past 24 hrs:    N/A  \   N/A   / N/A     N/A N/A N/A /  105 (H)   4.1 N/A 1.07 \       Imaging results reviewed over the past 24 hrs:   Recent Results (from the past 24 hour(s))   XR Knee Port Right 1/2 Views    Narrative    RIGHT KNEE PORTABLE ONE TO TWO VIEWS   4/16/2024 12:09 PM     HISTORY:  Postop total knee.    COMPARISON: None.      Impression    IMPRESSION: Recent total knee arthroplasty with adjacent gas.  Excellent position and alignment of components. No evidence of  complication or periprosthetic fracture.    LUZ BERRIOS MD         SYSTEM ID:  [id number]

## 2024-04-16 NOTE — PLAN OF CARE
Goal Outcome Evaluation:      Plan of Care Reviewed With: patient    Overall Patient Progress: improvingOverall Patient Progress: improving       Patient vital signs are at baseline: Yes  Patient able to ambulate as they were prior to admission or with assist devices provided by therapies during their stay:  Yes  Patient MUST void prior to discharge:  No,  Reason:  DTV  Patient able to tolerate oral intake:  Yes  Pain has adequate pain control using Oral analgesics:  Yes  Does patient have an identified :  Yes  Has goal D/C date and time been discussed with patient:  Yes

## 2024-04-16 NOTE — OP NOTE
Procedure Date: April 16, 2024    PATIENT: Sylvain Moss  1945     PREOPERATIVE DIAGNOSIS:  Right knee advanced osteoarthritis.     POSTOPERATIVE DIAGNOSIS:  Right knee advanced osteoarthritis.     PROCEDURE:  Right total knee arthroplasty.     SURGEON:  Chris Gamboa MD     ASSISTANT:  Margarita Akers PA-C     COMPLICATIONS:  None.     ESTIMATED BLOOD LOSS:  30 mL.     IMPLANTS:  [employer] system:  1.  Size 7, right, PS femoral component, cobalt chrome  2.  Size 7, right standard tibial baseplate.  3.  Size 11, 7/8, high flex PS polyethylene.  4.  Size 38 concentric polyethylene patella.     DESCRIPTION OF PROCEDURE:  The patient was brought to the operating room April 16, 2024 for right total knee arthroplasty.  Patient has a history of advanced osteoarthritis of the knee refractory to conservative measures. Patient was seen on the day of surgery in the preoperative holding area.  Right knee marked as the correct operative site.  Received a dose of IV antibiotic less than 30 minutes prior to surgical incision.  Post-surgical antibiotic and DVT prophylaxis are indicated and will be prescribed.  Skilled assistant was used for positioning, draping, retraction, closure, dressing application. Regional block administered by anesthesia.     The patient was brought to the operating room and placed under a spinal anesthesia.  The operative lower extremity was prepped and draped in the standard sterile fashion.  Preoperative timeout was taken.  Thigh tourniquet inflated to 300 mmHg.  Anterior longitudinal surgical incision was made.  Medial parapatellar arthrotomy was performed.  Advanced tricompartmental osteoarthritis confirmed.  Marginal osteophytes debrided.  Synovectomy performed.  Patella was cut to a thickness of 14 mm and sized to a 38.  Femur prepared with an intramedullary guide jimmie and a 4-degree valgus cutting guide.  I took an extra 6 mm of distal femur and sized the cut distal femur to  a 7.  Prepared in the appropriate rotational alignment.    The tibia was prepared using an extramedullary cutting guide, taking 9 mm of bone and cartilage off the lateral side.  The cut proximal tibia was sized to a 7 and was punched in the appropriate rotational alignment centered on the medial third of the tibial tubercle.  Posterior osteophytes and menisci were removed.  Posterior capsule injected with a cocktail of Toradol, ropivacaine and saline.    With trial components in place and a size 11 trial PS polyethylene, I was pleased with range of motion, stability, and gap balancing.  Trial components were removed.  Jet lavage irrigation performed.  Components listed above were cemented into place using 2 batch of high viscosity Simplex cement without antibiotic.  Once the cement had hardened and all extruded cement removed, I implanted the size 11 PS polyethylene for the 7 baseplate.    Tourniquet deflated.  Hemostasis achieved.  Betadine wash performed.  Jet lavage irrigation performed.  Arthrotomy was closed with interrupted Ethibond sutures over 1 g of vancomycin powder.  Superficial soft tissues irrigated and closed in layers.  A sterile dressing was applied.  The patient was brought to recovery in stable condition.  Needle and lap counts were correct at the end of the case.  There were no known complications.    Additional intraoperative findings of note: Surgical complexity due to obesity, degenerative deformity including flexion contracture.    Special postsurgical patient care factors: None     Chris Gamboa MD

## 2024-04-16 NOTE — PROGRESS NOTES
"   04/16/24 1700   Appointment Info   Signing Clinician's Name / Credentials (PT) Iliana Hoover DPT   Rehab Comments (PT) WBAT R LE   Living Environment   People in Home spouse   Current Living Arrangements house   Home Accessibility stairs to enter home;stairs within home   Number of Stairs, Main Entrance 2   Stair Railings, Main Entrance railings on both sides of stairs   Number of Stairs, Within Home, Primary eight   Stair Railings, Within Home, Primary railings safe and in good condition   Transportation Anticipated family or friend will provide   Living Environment Comments Pt lives in a house with his spouse, reports 2 HANK the home w/ B railing support. Pt has a walk in shower and a shower chair at home already.   Self-Care   Usual Activity Tolerance good   Current Activity Tolerance fair   Regular Exercise Yes   Activity/Exercise Type other (see comments)  (Golf)   Equipment Currently Used at Home none   Fall history within last six months no   Activity/Exercise/Self-Care Comment Pt is typically IND without an AD at baseline, reports having a FWW at home already.   General Information   Onset of Illness/Injury or Date of Surgery 04/16/24   Referring Physician Chris Gamboa MD   Patient/Family Therapy Goals Statement (PT) \"to go back home\"   Pertinent History of Current Problem (include personal factors and/or comorbidities that impact the POC) Pt is a 78 year old male who has medical history which includes hypertension, dyslipidemia, hypothyroidism, glucoma, restless leg syndrome, osteoarthritis of the left knee who underwent Right total knee arthroplasty and hospital medicine was consulted for management of chronic medical problems including hypertension, dyslipidemia.   Existing Precautions/Restrictions fall;weight bearing   Weight-Bearing Status - LUE full weight-bearing   Weight-Bearing Status - RUE full weight-bearing   Weight-Bearing Status - LLE full weight-bearing   Weight-Bearing Status - RLE " weight-bearing as tolerated   Cognition   Affect/Mental Status (Cognition) WNL   Orientation Status (Cognition) oriented x 4   Follows Commands (Cognition) WNL   Pain Assessment   Patient Currently in Pain No  (Pt reports pain is under control at this time)   Integumentary/Edema   Integumentary/Edema other (describe)   Integumentary/Edema Comments Pt has ACE wrap present on R LE   Posture    Posture Forward head position;Protracted shoulders   Range of Motion (ROM)   Range of Motion ROM deficits secondary to surgical procedure;ROM deficits secondary to pain;ROM deficits secondary to swelling;ROM deficits secondary to weakness   Strength (Manual Muscle Testing)   Strength (Manual Muscle Testing) Able to perform R SLR;Able to perform L SLR;Deficits observed during functional mobility   Bed Mobility   Comment, (Bed Mobility) Supine>sitting EOB completed w/ CGA   Transfers   Comment, (Transfers) Sit>stand completed w/ CGA and FWW   Gait/Stairs (Locomotion)   Comment, (Gait/Stairs) Pt ambulated w/ FWW and CGA   Balance   Balance other (describe)   Balance Comments P demonstrated good sitting balance, required FWW for all dynamic balance   Sensory Examination   Sensory Perception other (describe)   Sensory Perception Comments Pt reports still having numbness on R LE from spinal nerve block   Coordination   Coordination no deficits were identified   Muscle Tone   Muscle Tone no deficits were identified   Clinical Impression   Criteria for Skilled Therapeutic Intervention Yes, treatment indicated   PT Diagnosis (PT) Impaired functional mobility   Influenced by the following impairments Post-surgical pain, precautions, weakness, impaired activity tolerance   Functional limitations due to impairments Impaired gait and inability to complete ADL's   Clinical Presentation (PT Evaluation Complexity) stable   Clinical Presentation Rationale Clinical judgment   Clinical Decision Making (Complexity) low complexity   Planned Therapy  Interventions (PT) balance training;bed mobility training;gait training;home exercise program;motor coordination training;neuromuscular re-education;patient/family education;postural re-education;ROM (range of motion);stair training;strengthening;stretching;transfer training;progressive activity/exercise;risk factor education;home program guidelines   Risk & Benefits of therapy have been explained evaluation/treatment results reviewed;care plan/treatment goals reviewed;current/potential barriers reviewed;risks/benefits reviewed;participants voiced agreement with care plan;participants included;patient   PT Total Evaluation Time   PT Eval, Low Complexity Minutes (09796) 10   Physical Therapy Goals   PT Frequency Daily   PT Predicted Duration/Target Date for Goal Attainment 04/18/24   PT Goals Bed Mobility;Transfers;Gait;Stairs   PT: Bed Mobility Independent;Supine to/from sit;Rolling;Bridging;Within precautions   PT: Transfers Modified independent;Sit to/from stand;Bed to/from chair;Assistive device;Within precautions   PT: Gait Supervision/stand-by assist;Rolling walker;Greater than 200 feet;Within precautions   PT: Stairs Supervision/stand-by assist;2 stairs;Rail on both sides   Interventions   Interventions Quick Adds Gait Training;Therapeutic Activity;Therapeutic Procedure   Therapeutic Procedure/Exercise   Ther. Procedure: strength, endurance, ROM, flexibillity Minutes (92007) 4   Treatment Detail/Skilled Intervention Pt instructed in and completed supine LE exercises in order to increase LE circulation, ankle pumps and SLR completed x 5 reps each.   Therapeutic Activity   Therapeutic Activities: dynamic activities to improve functional performance Minutes (61422) 15   Treatment Detail/Skilled Intervention Eval completed and treatment indcated. Pt educated on WBAT on R LE. Supine>sitting EOB completed w/ CGA, cues given for UE support under R LE as needed. Sit>stand completed w/ CGA, cues given to push up from  bed rather than FWW when moving to standing. Post-ambulation, pt educated on walking program, discharge planning, icing, and to avoid elevation under the knee. Pt had questions regarding ice/compression machine and sleeping positions, answered all questions as able. Pt left sitting up in recliner with all needs in reach and RN notified.   Gait Training   Gait Training Minutes (25350) 12   Symptoms Noted During/After Treatment (Gait Training) fatigue   Treatment Detail/Skilled Intervention Pt ambulated ~90 ft w/ FWW and CGA, cues given for walker proximity, upright posture, and increased step length throughout. Pt also educated on walking program and pacing. mNo overt LOB occured.   Distance in Feet ~90 ft   Cecil Level (Gait Training) contact guard   Physical Assistance Level (Gait Training) verbal cues   Weight Bearing (Gait Training) weight-bearing as tolerated   Assistive Device (Gait Training) rolling walker   PT Discharge Planning   PT Plan Progress ambulation distance w/ FWW, trial stairs, LE exercises (bring handout to room)   PT Discharge Recommendation (DC Rec)   (Defer to Ortho)   PT Rationale for DC Rec Pt is moving below baseline mobility but doing well post-operatively. Anticipate following one additional session in order to address stairs that pt will be safe to return home with assist of spouse and continued OP PT as ordered by orthopedic team. Recommend usage of FWW for all functional mobility.   PT Brief overview of current status CGA w/ FWW   Total Session Time   Timed Code Treatment Minutes 31   Total Session Time (sum of timed and untimed services) 41   [facility]  OUTPATIENT PHYSICAL THERAPY EVALUATION  PLAN OF TREATMENT FOR OUTPATIENT REHABILITATION  (COMPLETE FOR INITIAL CLAIMS ONLY)  Patient's Last Name, First Name, M.I.  YOB: 1945  Sylvain Moss                        Provider's Name  [facility] Medical  Record No.  [id number]                             Onset Date:  04/16/24   Start of Care Date:      Type:     _X_PT   ___OT   ___SLP Medical Diagnosis:                 PT Diagnosis:  Impaired functional mobility Visits from SOC:  1     See note for plan of treatment, functional goals and certification details    I CERTIFY THE NEED FOR THESE SERVICES FURNISHED UNDER        THIS PLAN OF TREATMENT AND WHILE UNDER MY CARE     (Physician co-signature of this document indicates review and certification of the therapy plan).

## 2024-04-16 NOTE — BRIEF OP NOTE
[facility]    Brief Operative Note    Pre-operative diagnosis: Osteoarthritis of right knee [M17.11]  Post-operative diagnosis Same as pre-operative diagnosis    Procedure: Right Total Knee Arthroplasty, Right - Knee    Surgeon: Surgeons and Role:     * Chris Gamboa MD - Primary     * Margarita Akers PA-C - Assisting  Anesthesia: General   Estimated Blood Loss: 30    Drains: None  Specimens: * No specimens in log *  Findings:   None.  Complications: None.  Implants:   Implant Name Type Inv. Item Serial No.  Lot No. LRB No. Used Action   BONE CEMENT RADIOPAQUE SIMPLEX HV FULL DOSE 6194-1-001 - [id number] Cement, Bone BONE CEMENT RADIOPAQUE SIMPLEX HV FULL DOSE 6194-1-001  [employer] [id number] Right 2 Implanted   IMP COMP PATELLA SNN GEN II RESURFACING 38MM [id number] - [id number] Total Joint Component/Insert IMP COMP PATELLA SNN GEN II RESURFACING 38MM [id number]  [employer] [id number] Right 1 Implanted   IMP COMP FEMORAL S&N LEGION PS NP SZ7 RT [id number] - [id number] Total Joint Component/Insert IMP COMP FEMORAL S&N LEGION PS NP SZ7 RT [id number]  [employer] [id number] Right 1 Implanted   IMP BASEPLATE TIBIAL RICHIE II SZ 7 RT TI [id number] - [id number] Total Joint Component/Insert IMP BASEPLATE TIBIAL RICHIE II SZ 7 RT TI [id number]  [employer] [id number] Right 1 Implanted   IMP INSERT TIB S&N LGN PS HI FLEX XLPE SZ7-8 11MM [id number] - [id number] Total Joint Component/Insert IMP INSERT TIB S&N LGN PS HI FLEX XLPE SZ7-8 11MM [id number]  [employer] [id number] Right 1 Implanted

## 2024-04-16 NOTE — ANESTHESIA PREPROCEDURE EVALUATION
Anesthesia Pre-Procedure Evaluation    Patient: Sylvain Moss   MRN: [id number] : 1945        Procedure : Procedure(s):  Right Total Knee Arthroplasty          Past Medical History:   Diagnosis Date    Bipolar affective disorder (H)     Elevated serum cholesterol     Hypertension     Lipoma of skin and subcutaneous tissue     Nocturia     Obese     Prostate cancer (H)     Thyroid disease       Past Surgical History:   Procedure Laterality Date    ANKLE SURGERY Right     ARTHROSCOPY KNEE BILATERAL Bilateral       Allergies   Allergen Reactions    Penicillins       Social History     Tobacco Use    Smoking status: Never    Smokeless tobacco: Not on file   Substance Use Topics    Alcohol use: Yes     Comment: moderate      Wt Readings from Last 1 Encounters:   10/03/15 136.1 kg (300 lb)        Anesthesia Evaluation            ROS/MED HX  ENT/Pulmonary:     (+)     MERCEDES risk factors,  hypertension, obese,                             (-) tobacco use, asthma, COPD and sleep apnea   Neurologic:    (-) no seizures and no CVA   Cardiovascular:     (+) Dyslipidemia hypertension- -   -  - -                                   (-) CAD, CHF and arrhythmias   METS/Exercise Tolerance:     Hematologic:       Musculoskeletal:       GI/Hepatic:    (-) GERD and liver disease   Renal/Genitourinary:    (-) renal disease   Endo:     (+)               Obesity,    (-) Type I DM and Type II DM   Psychiatric/Substance Use:     (+) psychiatric history bipolar       Infectious Disease:       Malignancy:   (+) Malignancy, History of Prostate.    Other:            Physical Exam    Airway        Mallampati: II   TM distance: > 3 FB   Neck ROM: full   Mouth opening: > 3 cm    Respiratory Devices and Support         Dental  no notable dental history     (+) Minor Abnormalities - some fillings, tiny chips      Cardiovascular          Rhythm and rate: regular     Pulmonary           breath sounds clear to auscultation           OUTSIDE  "LABS:  CBC: No results found for: \"WBC\", \"HGB\", \"HCT\", \"PLT\"  BMP: No results found for: \"NA\", \"POTASSIUM\", \"CHLORIDE\", \"CO2\", \"BUN\", \"CR\", \"GLC\"  COAGS: No results found for: \"PTT\", \"INR\", \"FIBR\"  POC: No results found for: \"BGM\", \"HCG\", \"HCGS\"  HEPATIC: No results found for: \"ALBUMIN\", \"PROTTOTAL\", \"ALT\", \"AST\", \"GGT\", \"ALKPHOS\", \"BILITOTAL\", \"BILIDIRECT\", \"DANA\"  OTHER: No results found for: \"PH\", \"LACT\", \"A1C\", \"DEIDRE\", \"PHOS\", \"MAG\", \"LIPASE\", \"AMYLASE\", \"TSH\", \"T4\", \"T3\", \"CRP\", \"SED\"    Anesthesia Plan    ASA Status:  3       Anesthesia Type: Spinal.              Consents    Anesthesia Plan(s) and associated risks, benefits, and realistic alternatives discussed. Questions answered and patient/representative(s) expressed understanding.     - Discussed:     - Discussed with:  Patient            Postoperative Care    Pain management: Multi-modal analgesia.   PONV prophylaxis: Ondansetron (or other 5HT-3)     Comments:    Other Comments: Adductor canal block           Matt Neff MD    I have reviewed the pertinent notes and labs in the chart from the past 30 days and (re)examined the patient.  Any updates or changes from those notes are reflected in this note.             # Drug Induced Platelet Defect: home medication list includes an antiplatelet medication      "

## 2024-04-16 NOTE — ANESTHESIA POSTPROCEDURE EVALUATION
Patient: Sylvain Moss    Procedure: Procedure(s):  Right Total Knee Arthroplasty       Anesthesia Type:  Spinal    Note:     Postop Pain Control: Uneventful            Sign Out: Well controlled pain   PONV:    Neuro/Psych: Uneventful            Sign Out: Acceptable/Baseline neuro status   Airway/Respiratory: Uneventful            Sign Out: Acceptable/Baseline resp. status   CV/Hemodynamics: Uneventful            Sign Out: Acceptable CV status; No obvious hypovolemia; No obvious fluid overload   Other NRE:    DID A NON-ROUTINE EVENT OCCUR?            Last vitals:  Vitals Value Taken Time   /40 04/16/24 1230   Temp 36.1  C (97  F) 04/16/24 1230   Pulse 64 04/16/24 1240   Resp 9 04/16/24 1240   SpO2 99 % 04/16/24 1240   Vitals shown include unfiled device data.    Electronically Signed By: Matt Neff MD  April 16, 2024  3:26 PM

## 2024-04-17 ENCOUNTER — NURSE TRIAGE (OUTPATIENT)
Dept: NURSING | Facility: CLINIC | Age: 79
End: 2024-04-17

## 2024-04-17 ENCOUNTER — APPOINTMENT (OUTPATIENT)
Dept: OCCUPATIONAL THERAPY | Facility: CLINIC | Age: 79
End: 2024-04-17
Attending: ORTHOPAEDIC SURGERY
Payer: COMMERCIAL

## 2024-04-17 ENCOUNTER — APPOINTMENT (OUTPATIENT)
Dept: PHYSICAL THERAPY | Facility: CLINIC | Age: 79
End: 2024-04-17
Attending: ORTHOPAEDIC SURGERY
Payer: COMMERCIAL

## 2024-04-17 ENCOUNTER — TELEPHONE (OUTPATIENT)
Dept: UROLOGY | Facility: CLINIC | Age: 79
End: 2024-04-17
Payer: COMMERCIAL

## 2024-04-17 ENCOUNTER — HOSPITAL ENCOUNTER (EMERGENCY)
Facility: CLINIC | Age: 79
Discharge: HOME | End: 2024-04-17
Attending: EMERGENCY MEDICINE | Admitting: EMERGENCY MEDICINE
Payer: COMMERCIAL

## 2024-04-17 VITALS
DIASTOLIC BLOOD PRESSURE: 63 MMHG | HEIGHT: 72 IN | WEIGHT: 293 LBS | RESPIRATION RATE: 16 BRPM | OXYGEN SATURATION: 98 % | SYSTOLIC BLOOD PRESSURE: 136 MMHG | TEMPERATURE: 97.8 F | HEART RATE: 58 BPM | BODY MASS INDEX: 39.68 KG/M2

## 2024-04-17 VITALS
SYSTOLIC BLOOD PRESSURE: 147 MMHG | DIASTOLIC BLOOD PRESSURE: 75 MMHG | RESPIRATION RATE: 18 BRPM | HEART RATE: 98 BPM | OXYGEN SATURATION: 97 % | TEMPERATURE: 98.2 F

## 2024-04-17 DIAGNOSIS — T83.9XXA FOLEY CATHETER PROBLEM, INITIAL ENCOUNTER (H): ICD-10-CM

## 2024-04-17 DIAGNOSIS — R33.9 URINARY RETENTION: ICD-10-CM

## 2024-04-17 LAB
ANION GAP SERPL CALCULATED.3IONS-SCNC: 12 MMOL/L (ref 7–15)
BUN SERPL-MCNC: 21.8 MG/DL (ref 8–23)
CALCIUM SERPL-MCNC: 8.4 MG/DL (ref 8.8–10.2)
CHLORIDE SERPL-SCNC: 93 MMOL/L (ref 98–107)
CREAT SERPL-MCNC: 1 MG/DL (ref 0.67–1.17)
DEPRECATED HCO3 PLAS-SCNC: 22 MMOL/L (ref 22–29)
EGFRCR SERPLBLD CKD-EPI 2021: 77 ML/MIN/1.73M2
FASTING STATUS PATIENT QL REPORTED: NO
GLUCOSE SERPL-MCNC: 135 MG/DL (ref 70–99)
GLUCOSE SERPL-MCNC: 159 MG/DL (ref 70–99)
HGB BLD-MCNC: 10.8 G/DL (ref 13.3–17.7)
HOLD SPECIMEN: NORMAL
POTASSIUM SERPL-SCNC: 4 MMOL/L (ref 3.4–5.3)
SODIUM SERPL-SCNC: 127 MMOL/L (ref 135–145)

## 2024-04-17 PROCEDURE — 250N000011 HC RX IP 250 OP 636: Performed by: ORTHOPAEDIC SURGERY

## 2024-04-17 PROCEDURE — 36415 COLL VENOUS BLD VENIPUNCTURE: CPT | Performed by: ORTHOPAEDIC SURGERY

## 2024-04-17 PROCEDURE — 36415 COLL VENOUS BLD VENIPUNCTURE: CPT | Performed by: EMERGENCY MEDICINE

## 2024-04-17 PROCEDURE — 97535 SELF CARE MNGMENT TRAINING: CPT | Mod: GO

## 2024-04-17 PROCEDURE — 250N000013 HC RX MED GY IP 250 OP 250 PS 637: Performed by: HOSPITALIST

## 2024-04-17 PROCEDURE — 99214 OFFICE O/P EST MOD 30 MIN: CPT | Performed by: HOSPITALIST

## 2024-04-17 PROCEDURE — 51798 US URINE CAPACITY MEASURE: CPT

## 2024-04-17 PROCEDURE — 97116 GAIT TRAINING THERAPY: CPT | Mod: GP

## 2024-04-17 PROCEDURE — 85018 HEMOGLOBIN: CPT | Performed by: ORTHOPAEDIC SURGERY

## 2024-04-17 PROCEDURE — 51702 INSERT TEMP BLADDER CATH: CPT

## 2024-04-17 PROCEDURE — 82310 ASSAY OF CALCIUM: CPT | Performed by: EMERGENCY MEDICINE

## 2024-04-17 PROCEDURE — 80048 BASIC METABOLIC PNL TOTAL CA: CPT | Performed by: ORTHOPAEDIC SURGERY

## 2024-04-17 PROCEDURE — 99283 EMERGENCY DEPT VISIT LOW MDM: CPT

## 2024-04-17 PROCEDURE — 97165 OT EVAL LOW COMPLEX 30 MIN: CPT | Mod: GO

## 2024-04-17 PROCEDURE — 97530 THERAPEUTIC ACTIVITIES: CPT | Mod: GP

## 2024-04-17 PROCEDURE — 250N000013 HC RX MED GY IP 250 OP 250 PS 637: Performed by: ORTHOPAEDIC SURGERY

## 2024-04-17 PROCEDURE — 82947 ASSAY GLUCOSE BLOOD QUANT: CPT | Mod: 91 | Performed by: EMERGENCY MEDICINE

## 2024-04-17 RX ORDER — TAMSULOSIN HYDROCHLORIDE 0.4 MG/1
0.4 CAPSULE ORAL DAILY
Qty: 14 CAPSULE | Refills: 0 | Status: SHIPPED | OUTPATIENT
Start: 2024-04-17 | End: 2024-04-24

## 2024-04-17 RX ADMIN — SENNOSIDES AND DOCUSATE SODIUM 1 TABLET: 50; 8.6 TABLET ORAL at 08:10

## 2024-04-17 RX ADMIN — HYDROCHLOROTHIAZIDE 25 MG: 25 TABLET ORAL at 08:10

## 2024-04-17 RX ADMIN — POLYETHYLENE GLYCOL 3350 17 G: 17 POWDER, FOR SOLUTION ORAL at 08:10

## 2024-04-17 RX ADMIN — CEFAZOLIN SODIUM 2 G: 2 INJECTION, SOLUTION INTRAVENOUS at 00:43

## 2024-04-17 RX ADMIN — ASPIRIN 81 MG: 81 TABLET, COATED ORAL at 08:10

## 2024-04-17 RX ADMIN — LISINOPRIL 40 MG: 40 TABLET ORAL at 08:10

## 2024-04-17 RX ADMIN — ACETAMINOPHEN 975 MG: 325 TABLET, FILM COATED ORAL at 04:21

## 2024-04-17 RX ADMIN — LEVOTHYROXINE SODIUM 50 MCG: 50 TABLET ORAL at 08:10

## 2024-04-17 ASSESSMENT — ACTIVITIES OF DAILY LIVING (ADL)
ADLS_ACUITY_SCORE: 25
ADLS_ACUITY_SCORE: 38
ADLS_ACUITY_SCORE: 25
ADLS_ACUITY_SCORE: 38
ADLS_ACUITY_SCORE: 25
ADLS_ACUITY_SCORE: 38
ADLS_ACUITY_SCORE: 25
ADLS_ACUITY_SCORE: 38
ADLS_ACUITY_SCORE: 25
ADLS_ACUITY_SCORE: 38
ADLS_ACUITY_SCORE: 25
ADLS_ACUITY_SCORE: 25

## 2024-04-17 ASSESSMENT — COLUMBIA-SUICIDE SEVERITY RATING SCALE - C-SSRS
6. HAVE YOU EVER DONE ANYTHING, STARTED TO DO ANYTHING, OR PREPARED TO DO ANYTHING TO END YOUR LIFE?: NO
2. HAVE YOU ACTUALLY HAD ANY THOUGHTS OF KILLING YOURSELF IN THE PAST MONTH?: NO
1. IN THE PAST MONTH, HAVE YOU WISHED YOU WERE DEAD OR WISHED YOU COULD GO TO SLEEP AND NOT WAKE UP?: NO

## 2024-04-17 NOTE — PLAN OF CARE
Physical Therapy Discharge Summary    Reason for therapy discharge:    All goals and outcomes met, no further needs identified.    Progress towards therapy goal(s). See goals on Care Plan in Epic electronic health record for goal details.  Goals met    Therapy recommendation(s):    Continued therapy is recommended.  Rationale/Recommendations:   .  Continue home exercise program. PT Discharge Planning:    PT Plan: Discharge  PT Discharge Recommendation (DC Rec):  (Defer to Ortho)  PT Rationale for DC Rec: Pt is moving below baseline mobility but doing well post-operatively. Pt demonstrated ability to ambulate w/ FWW and complete stairs necessary to enter his home safely. Recommend continued OP PT as ordered by orthopedic team and usage of FWW for all functional mobility at discharge,  PT Brief overview of current status: CGA w/ FWW    Recommendation above provided by last treating therapist.

## 2024-04-17 NOTE — PROGRESS NOTES
04/17/24 0800   Appointment Info   Signing Clinician's Name / Credentials (OT) Sindhu Santana, OTR/L   Quick Adds   Quick Adds Certification   Living Environment   People in Home spouse   Current Living Arrangements house   Home Accessibility stairs to enter home;stairs within home   Number of Stairs, Main Entrance 2   Stair Railings, Main Entrance railings on both sides of stairs   Number of Stairs, Within Home, Primary eight   Stair Railings, Within Home, Primary railings safe and in good condition   Transportation Anticipated family or friend will provide   Living Environment Comments Pt lives in a house with his spouse, reports 2 HANK the home w/ B railing support. Pt has a walk in shower and a shower chair at home already.   Self-Care   Usual Activity Tolerance good   Current Activity Tolerance fair   Regular Exercise No   Activity/Exercise Type other (see comments)  (Golf)   Equipment Currently Used at Home none   Fall history within last six months no   Activity/Exercise/Self-Care Comment Pt is typically IND without an AD at baseline, reports having a FWW at home already. Ind ADLs baseline.   Instrumental Activities of Daily Living (IADL)   IADL Comments Will have spouse A.   General Information   Onset of Illness/Injury or Date of Surgery 04/16/24   Referring Physician Chris Gamboa MD   Patient/Family Therapy Goal Statement (OT) To go home   Additional Occupational Profile Info/Pertinent History of Current Problem Pt is a 77 y/o male s/p R TKA on 4/16/24, pt is POD#1.   Existing Precautions/Restrictions fall;weight bearing;other (see comments)  (ROM as tolerated, 0 degrees to flexion tolerance. Keep the knee straight to prevent a flexion contracture. Do NOT allow patient to rest with pillow under knee.)   Right Lower Extremity (Weight-bearing Status) weight-bearing as tolerated (WBAT)   Cognitive Status Examination   Orientation Status orientation to person, place and time   Visual Perception   Visual  Impairment/Limitations WFL   Sensory   Sensory Comments Pt does not report numbness/tingling   Pain Assessment   Patient Currently in Pain No   Range of Motion Comprehensive   Comment, General Range of Motion BUEs WFL   Strength Comprehensive (MMT)   Comment, General Manual Muscle Testing (MMT) Assessment BUEs WFL   Coordination   Upper Extremity Coordination No deficits were identified   Bed Mobility   Comment (Bed Mobility) SBA   Transfers   Transfers sit-stand transfer;toilet transfer;shower transfer   Sit-Stand Transfer   Sit/Stand Transfer Comments SBA   Shower Transfer   Shower Transfer Comments SBA   Toilet Transfer   Toilet Transfer Comments SBA   Balance   Balance Comments No overt LOB noted   Activities of Daily Living   BADL Assessment/Intervention lower body dressing   Lower Body Dressing Assessment/Training   Comment, (Lower Body Dressing) SBA   Clinical Impression   Criteria for Skilled Therapeutic Interventions Met (OT) Yes, treatment indicated   OT Diagnosis Decreased ind with I/ADLs   OT Problem List-Impairments impacting ADL problems related to;activity tolerance impaired;mobility   Assessment of Occupational Performance 3-5 Performance Deficits   Identified Performance Deficits bathing, dressing, heavy IADLs   Planned Therapy Interventions (OT) ADL retraining;IADL retraining;transfer training   Clinical Decision Making Complexity (OT) problem focused assessment/low complexity   Risk & Benefits of therapy have been explained patient   OT Total Evaluation Time   OT Eval, Low Complexity Minutes (73877) 10   Therapy Certification   Medical Diagnosis R TKA   Start of Care Date 04/17/24   Certification date from 04/17/24   Certification date to 04/17/24   OT Goals   Therapy Frequency (OT) One time eval and treatment   OT Predicted Duration/Target Date for Goal Attainment 04/17/24   OT Goals Lower Body Dressing;Transfers;Toilet Transfer/Toileting   OT: Lower Body Dressing Supervision/stand-by  assist  (FWW)   OT: Transfer Supervision/stand-by assist;with assistive device  (FWW)   OT: Toilet Transfer/Toileting Supervision/stand-by assist  (SBA)   Self-Care/Home Management   Self-Care/Home Mgmt/ADL, Compensatory, Meal Prep Minutes (67926) 23   Symptoms Noted During/After Treatment (Meal Preparation/Planning Training) fatigue   Treatment Detail/Skilled Intervention Pt greeted in supine, agreeable to OT. Pt educated on WBAT RLE, and positioning of small towel or pillow under heel when in supine to prevent flexion contracture/promote knee extension. Patient completes bed mobility supine to sitting EOB with SBA. Patient educated on LB dressing strategies for ease of task, including dressing the surgical side first and undressing non-surgical side first. Patient dons underwear, doffs B socks, dons B socks and shoes with SBA. Patient demonstrates sit > stand from EOB with SBA, FWW. Patient completes room level functional mobility to/from BR with SBA, FWW. Patient completes toilet transfer with SBA, FWW. Patient educated on walk in  transfer using side step technique with hands against wall for safety, educated on stepping in/out leading with the non-surgical leg first - patient demonstrates this with SBA and FWW. Patient mobilizes to chair and sits in chair with FWW. Pt educated on icing for pain management, keeping a layer between ice pack and skin for protection, icing no longer than 20 minutes at a time - pt provided with ice pack at end of session. Pt edu on options for RTS vs toilet safety frame vs BSC over toilet for ease of transfers, pt verbalized understanding. Patient up in chair with needs met, alarm set, items in reach.   OT Discharge Planning   OT Plan d/c   OT Discharge Recommendation (DC Rec)   (Defer to Ortho MD)   OT Rationale for DC Rec Pt functioning near baseline, mild decr activity julissa and mobility, impacting I/ADLs. Pt currently SBA mobility and self cares at room level with FWW. Spouse able  to assist with I/ADLs as needed. Pt with AE needs met. No further acute OT needs.   OT Brief overview of current status See above   Total Session Time   Timed Code Treatment Minutes 23   Total Session Time (sum of timed and untimed services) 33    [facility]  OUTPATIENT OCCUPATIONAL THERAPY  EVALUATION  PLAN OF TREATMENT FOR OUTPATIENT REHABILITATION  (COMPLETE FOR INITIAL CLAIMS ONLY)  Patient's Last Name, First Name, M.I.  YOB: 1945  Sylvain Moss                          Provider's Name  [facility] Medical Record No.  [id number]                             Onset Date:  04/16/24   Start of Care Date:  04/17/24   Type:     ___PT   _X_OT   ___SLP Medical Diagnosis:  R TKA                    OT Diagnosis:  Decreased ind with I/ADLs Visits from SOC:  1     See note for plan of treatment, functional goals and certification details    I CERTIFY THE NEED FOR THESE SERVICES FURNISHED UNDER        THIS PLAN OF TREATMENT AND WHILE UNDER MY CARE     (Physician co-signature of this document indicates review and certification of the therapy plan).

## 2024-04-17 NOTE — TELEPHONE ENCOUNTER
[facility] Call Center    Phone Message    May a detailed message be left on voicemail: yes     Reason for Call: Appointment Intake    Referring Provider Name: Self referred.  Diagnosis and/or Symptoms: Catheter removal    Call patient to schedule.    Action Taken: Other: Michelle - Urology    Travel Screening: Not Applicable

## 2024-04-17 NOTE — PLAN OF CARE
Occupational Therapy Discharge Summary    Reason for therapy discharge:    All goals and outcomes met, no further needs identified.    Progress towards therapy goal(s). See goals on Care Plan in [facility] electronic health record for goal details.  Goals met    Therapy recommendation(s):    Pt functioning near baseline, mild decr activity julissa and mobility, impacting I/ADLs. Pt currently SBA mobility and self cares at room level with FWW. Spouse able to assist with I/ADLs as needed. Pt with AE needs met. No further acute OT needs.

## 2024-04-17 NOTE — PROGRESS NOTES
[facility]  Hospitalist Progress Note        Layo Ku MD   04/17/2024        Interval History:        - Uribe catheter was placed 4/17 for urinary retention (956 ml) requiring several straight cath  -plan to discharge home on uribe catheter; can have voiding trial in 5 to7 days at PCP clinic follow-up; will also make a referral for outpatient urology  -will start Flomax 0.4 mg daily         Assessment and Plan:        Sylvain Moss is a 78 year old male with PMH of hypertension, dyslipidemia, hypothyroidism, glucoma, restless leg syndrome, osteoarthritis who underwent Right total knee arthroplasty (4/16/24) and hospital medicine was consulted for management of chronic medical problems including hypertension, dyslipidemia.      Status post Right knee total arthroplasty on 4/16/2024  - perioperative cares per orthopedics  - has been started on aspirin 81 mg BID for DVT prophylaxis    Postoperative acute urinary retention  - Uribe catheter was placed 4/17 for urinary retention (956 ml) requiring several straight cath  - plan to discharge home on uribe catheter; can have voiding trial in 5 to7 days at PCP clinic follow-up; will also make a referral for outpatient urology  - will start Flomax 0.4 mg daily     Hypertension  Hyperlipidemia  -resume PTA lisinopril, HCTZ and atorvastatin on discharge       Hypothyroidism  - continue with PTA levothyroxine     Glaucoma  - Continue with PTA latanoprost     Restless leg syndrome  - Continue with Mirapex 0.25 mg at bedtime      History of prostate cancer  -Noted    Diet:   Advance Diet as Tolerated: Regular Diet Adult  Discharge Instruction - Regular Diet Adult      DVT Prophylaxis: has been started on aspirin 81 mg BID for DVT prophylaxis per ortho    Code status: full code    Medically Ready for Discharge: Anticipated Today, plan for discharge per orthopedics today   :     Clinically Significant Risk Factors Present on Admission                   # Drug Induced Platelet Defect: home medication list includes an antiplatelet medication       # Hypertension: Home medication list includes antihypertensive(s)        # Obesity: Estimated body mass index is 39.74 kg/m  as calculated from the following:    Height as of this encounter: 1.829 m (6').    Weight as of this encounter: 132.9 kg (293 lb).                 Page Me (7 am to 6 pm)    Care plan discussed with patient and nursing              Physical Exam:      Blood pressure 136/63, pulse 58, temperature 97.8  F (36.6  C), temperature source Oral, resp. rate 16, height 1.829 m (6'), weight 132.9 kg (293 lb), SpO2 98%.  Vitals:    04/16/24 0735   Weight: 132.9 kg (293 lb)     Vital Signs with Ranges  Temp:  [97  F (36.1  C)-98.6  F (37  C)] 97.8  F (36.6  C)  Pulse:  [57-96] 58  Resp:  [12-23] 16  BP: (101-153)/(40-96) 136/63  SpO2:  [93 %-100 %] 98 %  I/O's Last 24 hours  I/O last 3 completed shifts:  In: 1400 [I.V.:1400]  Out: 3620 [Urine:3620]    Constitutional: Alert, awake and oriented X 3; resting comfortably in no apparent distress       Oral cavity: Moist mucosa   Cardiovascular: Normal s1 s2, regular rate and rhythm, no murmur   Lungs: B/l clear to auscultation, no wheezes or crepitations   Abdomen: Soft, nt, nd, no guarding, rigidity or rebound; BS +   LE : No edema; right TKA status in knee immobilizer   Musculoskeletal/Neuro Power 5/5 in all extremities; No focal neurological deficits noted   Psychiatry: normal mood and affect                Medications:        Current Facility-Administered Medications   Medication Dose Route Frequency Provider Last Rate Last Admin    acetaminophen (TYLENOL) tablet 975 mg  975 mg Oral Q8H Chris Gamboa MD   975 mg at 04/17/24 0421    aspirin EC tablet 81 mg  81 mg Oral BID Chris Gamboa MD   81 mg at 04/16/24 2006    atorvastatin (LIPITOR) tablet 20 mg  20 mg Oral At Bedtime Chris Gamboa MD   20 mg at 04/16/24 2134    fluticasone (FLONASE) 50  MCG/ACT spray 2 spray  2 spray Both Nostrils At Bedtime Kay Lambert MD   2 spray at 04/16/24 2019    hydrochlorothiazide (HYDRODIURIL) tablet 25 mg  25 mg Oral Daily Kay Lambert MD        latanoprost (XALATAN) 0.005 % ophthalmic solution 1 drop  1 drop Both Eyes At Bedtime Chris Gamboa MD   1 drop at 04/16/24 2018    levothyroxine (SYNTHROID/LEVOTHROID) tablet 50 mcg  50 mcg Oral Daily Chris Gamboa MD        lisinopril (ZESTRIL) tablet 40 mg  40 mg Oral Daily Kay Lambert MD        polyethylene glycol (MIRALAX) Packet 17 g  17 g Oral Daily Chris Gamboa MD        pramipexole (MIRAPEX) tablet 0.25 mg  0.25 mg Oral At Bedtime Chris Gamboa MD   0.25 mg at 04/16/24 2006    senna-docusate (SENOKOT-S/PERICOLACE) 8.6-50 MG per tablet 1 tablet  1 tablet Oral BID Chris Gamboa MD   1 tablet at 04/16/24 2006    sodium chloride (PF) 0.9% PF flush 3 mL  3 mL Intracatheter Q8H Chris Gamboa MD   3 mL at 04/17/24 0417     PRN Meds:   Current Facility-Administered Medications   Medication Dose Route Frequency Provider Last Rate Last Admin    [START ON 4/19/2024] acetaminophen (TYLENOL) tablet 650 mg  650 mg Oral Q4H PRN Chris Gamboa MD        benzocaine-menthol (CHLORASEPTIC) 6-10 MG lozenge 1 lozenge  1 lozenge Buccal Q1H PRN Chris Gamboa MD        [START ON 4/19/2024] bisacodyl (DULCOLAX) suppository 10 mg  10 mg Rectal Daily PRN Chris Gamboa MD        HYDROmorphone (DILAUDID) injection 0.2 mg  0.2 mg Intravenous Q2H PRN Chris Gamboa MD        Or    HYDROmorphone (DILAUDID) injection 0.4 mg  0.4 mg Intravenous Q2H PRN Chris Gamboa MD        HYDROmorphone (DILAUDID) tablet 2 mg  2 mg Oral Q4H PRN Chris Gamboa MD   2 mg at 04/16/24 1815    Or    HYDROmorphone (DILAUDID) tablet 4 mg  4 mg Oral Q4H PRN Chris Gamboa MD        hydrOXYzine HCl (ATARAX) tablet 10 mg  10 mg Oral Q6H PRN Chris Gamboa MD   10 mg at 04/16/24 1815    lidocaine (LMX4)  "cream   Topical Q1H PRN Chris Gamboa MD        lidocaine 1 % 0.1-1 mL  0.1-1 mL Other Q1H PRN Chris Gamboa MD        [START ON 4/18/2024] magnesium hydroxide (MILK OF MAGNESIA) suspension 30 mL  30 mL Oral Daily PRN Chris Gamboa MD        naloxone (NARCAN) injection 0.2 mg  0.2 mg Intravenous Q2 Min PRChris Quiroz MD        Or    naloxone (NARCAN) injection 0.4 mg  0.4 mg Intravenous Q2 Min PRN Chris Gamboa MD        Or    naloxone (NARCAN) injection 0.2 mg  0.2 mg Intramuscular Q2 Min PRN Chris Gamboa MD        Or    naloxone (NARCAN) injection 0.4 mg  0.4 mg Intramuscular Q2 Min PRChris Quiroz MD        ondansetron (ZOFRAN ODT) ODT tab 4 mg  4 mg Oral Q6H PRN Chris Gamboa MD        Or    ondansetron (ZOFRAN) injection 4 mg  4 mg Intravenous Q6H PRChris Quiroz MD        prochlorperazine (COMPAZINE) injection 5 mg  5 mg Intravenous Q6H PRChris Quiroz MD        Or    prochlorperazine (COMPAZINE) tablet 5 mg  5 mg Oral Q6H PRN Chris Gamboa MD        sodium chloride (PF) 0.9% PF flush 3 mL  3 mL Intracatheter q1 min prChris Quiroz MD                Data:      All new lab and imaging data was reviewed.   No lab results found.   Recent Labs   Lab Test 04/16/24  0724   POTASSIUM 4.1   CR 1.07   *     No lab results found.    Invalid input(s): \"TROP\", \"TROPONINIES\"      "

## 2024-04-17 NOTE — PROGRESS NOTES
Orthopedic Surgery  Sylvain Moss  04/17/2024     Admit Date:  4/16/2024    POD: 1 Day Post-Op   Procedure(s):  Right Total Knee Arthroplasty    Patient resting comfortably in bed.    Pain controlled.  Tolerating oral intake.    Required straight cath x 2, voided and bladder scanned and retaining urine  Denies nausea or vomiting.  Denies chest pain or shortness of breath.  No acute events overnight.    Temp:  [96.2  F (35.7  C)-98.6  F (37  C)] 98.2  F (36.8  C)  Pulse:  [57-96] 57  Resp:  [12-23] 16  BP: (101-153)/(40-96) 148/73  SpO2:  [93 %-100 %] 97 %    Alert and oriented.   Right dressing is clean, dry, and intact.   Bilateral calves are soft, non-tender.  Right lower extremity is NVI.  Patient able to resist ankle dorsiflexion and plantar flexion bilaterally.  DP pulse palpable.  Sensation intact bilateral lower extremities.      A/P    DVT prophylaxis: Aspirin  Activity:  WBAT   Pain: Dilaudid/tylenol  Dressings: daily island dressing. Change prior to discharge.    Anticipate discharge to home later this morning with OP PT pending medicine clearance    Follow-up: 2 weeks post-op with Margarita Akers PA-C/Chris Akers PA-C  [facility]

## 2024-04-17 NOTE — TELEPHONE ENCOUNTER
Nurse Triage SBAR    Is this a 2nd Level Triage? YES, LICENSED PRACTITIONER REVIEW IS REQUIRED    Situation: Patient calling with concerns that indwelling catheter may not be draining.     Background: 4/17/24-Right Total Knee Arthroplasty   Placed catheter today with bag.   Had 100 ml in the leg bag when he got home  around noon. Since then he feels a sensation of urinating but there is no urine in the catheter. He does not think he is distended. Urine is pink colored.   No pain or fever.     Assessment: questions about catheter working. Asked him to change the leg bag over to the overnight bag to see if that produces any urine in the catheter. He said it did not. He said that the tubing is not kinked and the bag is lower than his bladder. Advised patient that his input is less than when he was inpatient given he has no IV fluids going and he has had hardly anything to drink since returning home today.    Protocol Recommended Disposition:   Home Care    Recommendation: Please call patient to check on status on 4/17. Spoke with a nurse in Urology at [facility] who recommended he monitor his intake vs output for next 3 hours and if he makes no urine to go into the ED. Patient agreed to do that.       Routed to provider/Team  [facility]    Shirley Schuler RN  P Red Flag Triage/MRT       Reason for Disposition   No urine in bag for < 4 hours    Additional Information   Negative: Shock suspected (e.g., cold/pale/clammy skin, too weak to stand, low BP, rapid pulse)   Negative: Sounds like a life-threatening emergency to the triager   Negative: Catheter was accidentally pulled-out and bright red continuous bleeding   Negative: SEVERE abdominal pain   Negative: Fever > 100.4 F (38.0 C)   Negative: Drinking very little and dehydration suspected (e.g., no urine > 12 hours, very dry mouth, very lightheaded)   Negative: Patient sounds very sick or weak to the triager   Negative: Catheter was accidentally pulled-out   Negative:  "Bleeding around catheter (e.g., from penis or female urethra)   Negative: Lower abdominal pain or distention   Negative: Tea-colored or slightly red urine lasts > 24 hours that is not cleared by increasing fluid intake, and no recent prostate or bladder surgery   Negative: Cloudy urine lasts > 24 hours and not cleared by increasing fluid intake   Negative: Bloody or red-colored urine and no recent prostate or bladder surgery (Exception: brief episode and urine now clear)   Negative: Bloody or red-colored urine and prostate or bladder surgery > 3 days (72 hours) ago   Negative: No urine in bag for > 4 hours and catheter is not kinked   Negative: Urine smells bad   Negative: Leakage of urine around catheter   Negative: Catheter is broken or cracked and is still usable   Negative: Patient wants to be seen    Answer Assessment - Initial Assessment Questions  1. SYMPTOMS: \"What symptoms are you concerned about?\"      Catheter not draining into the leg bag   2. ONSET:  \"When did the symptoms start?\"      Since he got home  3. FEVER: \"Is there a fever?\" If so, ask: \"What is the temperature, how was it measured, and when did it start?\"      no  4. ABDOMINAL PAIN: \"Is there any abdominal pain?\" (e.g., Scale 1-10; or mild, moderate, severe)      none  5. URINE COLOR: \"What color is the urine?\"  \"Is there blood present in the urine?\" (e.g., clear, yellow, cloudy, tea-colored, blood streaks, bright red)      pink  6. ONSET: \"When was the catheter inserted?\"      Today upon discharge  7. OTHER SYMPTOMS: \"Do you have any other symptoms?\" (e.g., back pain, bad urine odor)       none  8. PREGNANCY: \"Is there any chance you are pregnant?\" \"When was your last menstrual period?\"      na    Protocols used: Urinary Catheter Symptoms and [id number]    "

## 2024-04-17 NOTE — PROGRESS NOTES
Patient vital signs are at baseline: Yes  Patient able to ambulate as they were prior to admission or with assist devices provided by therapies during their stay:  Yes  Patient MUST void prior to discharge:  No,  Reason:  Uribe placed, will be discharging with uribe.  Patient able to tolerate oral intake:  Yes  Pain has adequate pain control using Oral analgesics:  Yes  Does patient have an identified :  Yes  Has goal D/C date and time been discussed with patient:  Yes    Pt A&Ox4. VSS on RA. Up 1A GB&W. Regular diet. Denies pain. DTV, Uribe placed for retention, will be dischaging with uribe. PIV removed, discharge meds and belongings sent with patient. Discharge education provided to pt, verbalized understanding. Continue plan of care.

## 2024-04-17 NOTE — ED TRIAGE NOTES
Pt was discharged today from hospital, after having TKA, having urinary retention since the PACU. Has been straight cath'd x 3 and then uribe placed this morning. Since being home, uribe has not drained.

## 2024-04-17 NOTE — PLAN OF CARE
Goal Outcome Evaluation:  Patient vital signs are at baseline: Yes  Patient able to ambulate as they were prior to admission or with assist devices provided by therapies during their stay:  Yes  Patient MUST void prior to discharge:  No,  Reason:  Retaining urine.  Patient able to tolerate oral intake:  Yes  Pain has adequate pain control using Oral analgesics:  Yes  Does patient have an identified :  Yes  Has goal D/C date and time been discussed with patient:  Yes      VSS on RA. Dressing CDI. CMS intact.  PIV SL on the L hand. Bladder scanned 918 ml @ 2230. Straight catheterization done, 850 ml dark brown to reddish urine with clots. Bladder scanned @ 0600, 956 ml, hospitalist notified. Pleitez catheter inserted as ordered, 1050 ml tea colored-pinkish urine output.

## 2024-04-17 NOTE — PROVIDER NOTIFICATION
MD Notification    Notified Person: MD    Notified Person Name: Selvin Coto    Notification Date/Time: 4/17/24 @ 0622    Notification Interaction:VOCERA    Purpose of Notification: Bladder scan showed 956 ml    Orders Received: Place uribe cath for retention.    Comments:  Catheter in place and draining well.

## 2024-04-18 ENCOUNTER — NURSE TRIAGE (OUTPATIENT)
Dept: NURSING | Facility: CLINIC | Age: 79
End: 2024-04-18
Payer: COMMERCIAL

## 2024-04-18 NOTE — TELEPHONE ENCOUNTER
Nurse Triage SBAR      Situation: leaking catheter    Background:   Uribe catheter replaced last night at the ED as there was very minimal output. Pt said this was his 4th catheter replacement in a span of a few days    Assessment:   Woke up today with leaking fluid around catheter. Repositioned catheter tube and uribe catheter drained a significant amount of urine.  Very mild back pain  No abdominal pain  Urine appears clear, no foul smell, not cloudy.    Protocol Recommended Disposition:   See in Office Today or Tomorrow    Recommendation:   Upcoming urology appointment on 4/24 with Ivone Simpson. Any possibility pt can be seen sooner per protocol?    Pt is not receiving skilled nursing visits    Routed to provider  Please call pt [phone]    FNA advised that if he has severe abdominal pain, go to ED.  Ensure to tape tube in medial thigh to avoid tugging.    Pt verbalized understanding.    Melisa Vines RN/[place] Nurse Advisor          Reason for Disposition   Leakage of urine around catheter    Additional Information   Negative: Shock suspected (e.g., cold/pale/clammy skin, too weak to stand, low BP, rapid pulse)   Negative: Sounds like a life-threatening emergency to the triager   Negative: SEVERE abdominal pain   Negative: Catheter was accidentally pulled-out and bright red continuous bleeding   Negative: Fever > 100.4 F (38.0 C)   Negative: Drinking very little and dehydration suspected (e.g., no urine > 12 hours, very dry mouth, very lightheaded)   Negative: Patient sounds very sick or weak to the triager   Negative: Catheter was accidentally pulled-out   Negative: Catheter is broken and is not working (does not function normally)   Negative: Bleeding around catheter (e.g., from penis or female urethra)   Negative: New-onset MILD - MODERATE lower abdominal pain or swelling (distention)   Negative: Pink, slightly red, or tea-colored urine lasts > 24 hours that is not cleared by increased fluid intake, and  no recent prostate or bladder surgery   Negative: Cloudy urine lasts > 24 hours and not cleared by increased fluid intake   Negative: Bloody or red-colored urine and no recent prostate or bladder surgery  (Exception: Brief episode and urine now clear.)   Negative: Bloody or red-colored urine and prostate or bladder surgery > 3 days (72 hours) ago   Negative: No urine in bag for > 4 hours and catheter is not kinked   Negative: Foul-smelling urine (urine smells bad)    Protocols used: Urinary Catheter (e.g., Pleitez) Symptoms and [id number]

## 2024-04-18 NOTE — DISCHARGE INSTRUCTIONS
Drink plenty of fluids to stay well hydrated and flush your catheter. Return if you develop severe flank pain, the catheter isn't working your develop fevers.

## 2024-04-19 ENCOUNTER — HOSPITAL ENCOUNTER (EMERGENCY)
Facility: CLINIC | Age: 79
Discharge: HOME | End: 2024-04-19
Attending: EMERGENCY MEDICINE | Admitting: EMERGENCY MEDICINE
Payer: COMMERCIAL

## 2024-04-19 ENCOUNTER — NURSE TRIAGE (OUTPATIENT)
Dept: FAMILY MEDICINE | Facility: CLINIC | Age: 79
End: 2024-04-19
Payer: COMMERCIAL

## 2024-04-19 VITALS
RESPIRATION RATE: 17 BRPM | DIASTOLIC BLOOD PRESSURE: 73 MMHG | HEART RATE: 78 BPM | SYSTOLIC BLOOD PRESSURE: 137 MMHG | TEMPERATURE: 97 F | OXYGEN SATURATION: 99 %

## 2024-04-19 DIAGNOSIS — H60.91 OTITIS EXTERNA OF RIGHT EAR, UNSPECIFIED CHRONICITY, UNSPECIFIED TYPE: ICD-10-CM

## 2024-04-19 DIAGNOSIS — R31.0 GROSS HEMATURIA: ICD-10-CM

## 2024-04-19 DIAGNOSIS — T83.091A OBSTRUCTED FOLEY CATHETER, INITIAL ENCOUNTER (H): ICD-10-CM

## 2024-04-19 PROCEDURE — 250N000013 HC RX MED GY IP 250 OP 250 PS 637: Performed by: EMERGENCY MEDICINE

## 2024-04-19 PROCEDURE — 51702 INSERT TEMP BLADDER CATH: CPT

## 2024-04-19 PROCEDURE — 51798 US URINE CAPACITY MEASURE: CPT

## 2024-04-19 PROCEDURE — 99284 EMERGENCY DEPT VISIT MOD MDM: CPT

## 2024-04-19 RX ORDER — NEOMYCIN SULFATE, POLYMYXIN B SULFATE AND HYDROCORTISONE 10; 3.5; 1 MG/ML; MG/ML; [USP'U]/ML
3 SUSPENSION/ DROPS AURICULAR (OTIC) 4 TIMES DAILY
Qty: 10 ML | Refills: 0 | Status: SHIPPED | OUTPATIENT
Start: 2024-04-19 | End: 2024-05-17

## 2024-04-19 RX ORDER — OXYBUTYNIN CHLORIDE 5 MG/1
5 TABLET ORAL 3 TIMES DAILY PRN
Qty: 20 TABLET | Refills: 0 | Status: SHIPPED | OUTPATIENT
Start: 2024-04-19 | End: 2024-05-17

## 2024-04-19 RX ORDER — OXYBUTYNIN CHLORIDE 5 MG/1
5 TABLET ORAL ONCE
Status: COMPLETED | OUTPATIENT
Start: 2024-04-19 | End: 2024-04-19

## 2024-04-19 RX ADMIN — OXYBUTYNIN CHLORIDE 5 MG: 5 TABLET ORAL at 19:47

## 2024-04-19 ASSESSMENT — ACTIVITIES OF DAILY LIVING (ADL)
ADLS_ACUITY_SCORE: 38

## 2024-04-19 ASSESSMENT — COLUMBIA-SUICIDE SEVERITY RATING SCALE - C-SSRS
2. HAVE YOU ACTUALLY HAD ANY THOUGHTS OF KILLING YOURSELF IN THE PAST MONTH?: NO
6. HAVE YOU EVER DONE ANYTHING, STARTED TO DO ANYTHING, OR PREPARED TO DO ANYTHING TO END YOUR LIFE?: NO
1. IN THE PAST MONTH, HAVE YOU WISHED YOU WERE DEAD OR WISHED YOU COULD GO TO SLEEP AND NOT WAKE UP?: NO

## 2024-04-19 NOTE — ED NOTES
Informed MD regarding clear urine output from bladder irrigation. Catheter leaking urine from urethra. Verbal order from MD to removed catheter at this time for trial to void.

## 2024-04-19 NOTE — ED PROVIDER NOTES
History     Chief Complaint:  Hematuria and Catheter Problem     HPI   Sylvain Moss is a 78 year old male, s/p right TKA 3 days ago (4/16/24), with history of HTN and prostate cancer who presents to the ED with a catheter problem. Patient states that he experienced urinary retention in the recovery room following his knee replacement. He had 600 mL drained from his bladder at this time. Later that day he had to get up 3-4 times throughout the night to urinate. He had another catheter placed the following morning without difficulty and was discharged from the hospital with a catheter in place. He was seen in the ED two days ago because his catheter was plugged and he noticed hematuria. His catheter was replaced again during this visit, though patient says it was painful. Cristian notes that he has been doing well the last few days until he noticed urine was leaking around his penis yesterday. He adjusted his genitals at this time and says this problem resolved. He then noticed that the catheter was leaking again today and saw clots in his catheter bag, prompting his presentation to the ED. Patient arrives to the ED with a 16 Czech catheter in place. He adds that he has been feeling like he has to urinate, but not a lot of urine is coming out. The urine that he has urinated from his penis recently as been clear. Denies recent fevers and other complaints today. Patient says he has been taking 2 low-dose Aspirin per day since his surgery, though is not otherwise anticoagulated. He is also on Flomax. Patient's wife also reports that he has an upcoming appointment with [facility] on Wednesday of next week.     Independent Historian:   Spouse/Partner - They report additional history as noted above.     Review of External Notes:   I reviewed the ED note from 4/17/24 where the patient was seen for similar symptoms.      Medications:    Aspirin 81 mg   Lipitor   Flonase   Hydrodiuril   Synthroid   Zestril   Mirapex    Senokot   Flomax   Pro-air   Ultram     Past Medical History:    Bipolar affective disorder   Elevated serum cholesterol   HTN  Lipoma of skin and subcutaneous tissue   Nocturia   Obesity   Prostate cancer   Thyroid disease     Past Surgical History:    Ankle surgery, right   TKA, bilateral      Physical Exam   Patient Vitals for the past 24 hrs:   BP Temp Temp src Pulse Resp SpO2   04/19/24 1923 137/73 -- -- 78 17 99 %   04/19/24 1406 120/54 97  F (36.1  C) Temporal 94 20 99 %        Physical Exam  Nursing note and vitals reviewed.    Constitutional:  Appears comfortable.    HENT:    Nose normal.  No discharge.  Right TM is difficult to see due to the swelling in the canal.  There is a lot of red and purplish colored granulation tissue with irritation and small amount of drainage in the right canal.     Oral mucosa is moist.    GI:    Soft. No suprapubic tenderness.      No flank pain.       Pleitez catheter with blood in it.   Neurological:   Alert and oriented. No focal weakness.  Skin:    Skin is warm and dry. No rash noted. No diaphoresis.      No erythema. No pallor.  No lesions.  Psychiatric:   Behavior is normal. Appropriate mood and affect.     Judgment and thought content normal.     Emergency Department Course     Emergency Department Course & Assessments:    Interventions:  Medications   oxyBUTYnin (DITROPAN) tablet 5 mg (5 mg Oral $Given 4/19/24 1947)        Assessments:  1412 I obtained history and examined the patient as noted above.  1555 We will remove the catheter and see if he can urinate on his own at this time.   1735 Rechecked the patient.     Social Determinants of Health affecting care:   None    Disposition:  The patient was discharged.     Impression & Plan    CMS Diagnoses: None       Medical Decision Making:  Patient comes in because his catheter is plugged again and he is passing blood.  He was on blood thinners but now back take an aspirin.  The catheter was replaced he continue to leak  around but his urine seem to clear more after was irrigated.  However with the leakage he wanted to try having the catheter out see if he could urinate.  So we pulled it out and it was unsuccessful.  He had over 800 again in there after trying to urinate and so a 22 French three-way catheter was placed.  He did not need to be irrigated again.  It is blood-tinged but much clear.  He feels better.  I gave him some did Trabant 5 mg orally which helped the bladder spasms no longer leaking around it.  I am going to prescribe some Ditropan for him to be used if he starts having spasms.  He will see his urologist as scheduled next Wednesday.  He has been looking his right ear has been bothering him off and on and looking in it there is a lot of granulation tissue some redness and irritation may be a little bit of discharge.  He needs to see an ENT doctor for this.  However I am going to put him on some eardrops as he has some tenderness and I put a referral through for ENT to see him this next week.  He will keep water out of his ear and use the Cortisporin otic drops as directed.  He will return to the ER if the catheter reobstructs.      Start the eardrops as directed tomorrow.  You should be called Monday to schedule appointment this next week with ENT.  Keep water out of your ear.  If your catheter plugs and stops draining, you need to return to the ER.  Take the oxybutynin up to 3 times a day as needed for spasms if it leaks around the catheter.    Diagnosis:    ICD-10-CM    1. Obstructed Pleitez catheter, initial encounter (H24)  T83.091A       2. Gross hematuria  R31.0       3. Otitis externa of right ear, unspecified chronicity, unspecified type  H60.91 Adult ENT  Referral           Discharge Medications:  New Prescriptions    NEOMYCIN-POLYMYXIN-HYDROCORTISONE (CORTISPORIN) [id number] OTIC SUSPENSION    Place 3 drops into the right ear 4 times daily    OXYBUTYNIN (DITROPAN) 5 MG TABLET    Take 1 tablet (5  mg) by mouth 3 times daily as needed for bladder spasms          Scribe Disclosure:  I, Merry Rodríguez, am serving as a scribe at 2:24 PM on 4/19/2024 to document services personally performed by Erin Iglesias MD based on my observations and the provider's statements to me.   4/19/2024   Erin Iglesias MD Powell, Tracy Alan, MD  04/19/24 2042

## 2024-04-19 NOTE — ED TRIAGE NOTES
Patient dealing with cath issues post right knee surgery Tuesday, today the cath is leaking around the penis and is concerned for blockage. Has hematuria, but denies pain. Patient states this is the 4th catheter this week related to retention.

## 2024-04-19 NOTE — TELEPHONE ENCOUNTER
Referral to Acute and Diagnostic Services    [phone] ([place]) [place] [address] [facility], [address], [place] [address]    Transition to Acute & Diagnostic Services Clinic has been discussed with patient, and he agrees with next level of care.   Patient understands that evaluation/treatment at ADS typically takes significantly longer than in clinic/urgent care (>2 hours).  The [facility] Acute and Diagnostics Services Clinic has been contacted by provider/staff to confirm patient acceptance.         Special issues:      Report given to Dr. Meyer who advised due to patient having repeat problems with Pleitez catheter, patient needs to go to ED for evaluation and treatment.   Dora Schulte RN

## 2024-04-19 NOTE — TELEPHONE ENCOUNTER
Patient calling back today to inform that his uribe catheter is no longer draining his urine.Urine is leaking around the tube.     See triage note yesterday. States that the urine is looking much lighter in color, but acting like when it is blocked.    Patient has urology appointment next week, but urology won't see patient's for urgent concern today.     Referral to Acute and Diagnostic Services    [phone] ([place]) [place] [address], [address], [place] [id number]    Transition to Acute & Diagnostic Services Clinic has been discussed with patient, and he agrees with next level of care.   Patient understands that evaluation/treatment at ADS typically takes significantly longer than in clinic/urgent care (>2 hours).  The [facility] Acute and Diagnostics Services Clinic has been contacted by provider/staff to confirm patient acceptance.                                       Dora Schulte RN

## 2024-04-20 NOTE — ED NOTES
Pt discharged with family. Catheter cleaning supplies provided to pt. Pt and family informed about catheter cares. No questions at this time. Pt A&Ox4 at time of discharge, denies pain.

## 2024-04-20 NOTE — DISCHARGE INSTRUCTIONS
Start the eardrops as directed tomorrow.  You should be called Monday to schedule appointment this next week with ENT.  Keep water out of your ear.  If your catheter plugs and stops draining, you need to return to the ER.  Take the oxybutynin up to 3 times a day as needed for spasms if it leaks around the catheter.

## 2024-04-24 ENCOUNTER — OFFICE VISIT (OUTPATIENT)
Dept: UROLOGY | Facility: CLINIC | Age: 79
End: 2024-04-24
Payer: COMMERCIAL

## 2024-04-24 VITALS
WEIGHT: 288 LBS | OXYGEN SATURATION: 97 % | SYSTOLIC BLOOD PRESSURE: 124 MMHG | HEIGHT: 72 IN | BODY MASS INDEX: 39.01 KG/M2 | DIASTOLIC BLOOD PRESSURE: 68 MMHG | HEART RATE: 87 BPM

## 2024-04-24 DIAGNOSIS — R33.9 URINARY RETENTION: ICD-10-CM

## 2024-04-24 DIAGNOSIS — R31.0 GROSS HEMATURIA: Primary | ICD-10-CM

## 2024-04-24 LAB — RESIDUAL VOLUME (RV) (EXTERNAL): NORMAL

## 2024-04-24 PROCEDURE — 51798 US URINE CAPACITY MEASURE: CPT | Performed by: STUDENT IN AN ORGANIZED HEALTH CARE EDUCATION/TRAINING PROGRAM

## 2024-04-24 PROCEDURE — 99203 OFFICE O/P NEW LOW 30 MIN: CPT | Mod: 25 | Performed by: STUDENT IN AN ORGANIZED HEALTH CARE EDUCATION/TRAINING PROGRAM

## 2024-04-24 RX ORDER — TAMSULOSIN HYDROCHLORIDE 0.4 MG/1
0.4 CAPSULE ORAL DAILY
Qty: 90 CAPSULE | Refills: 3 | Status: SHIPPED | OUTPATIENT
Start: 2024-04-24 | End: 2024-05-06

## 2024-04-24 ASSESSMENT — PAIN SCALES - GENERAL: PAINLEVEL: MILD PAIN (2)

## 2024-04-24 NOTE — NURSING NOTE
Chief Complaint   Patient presents with    Urinary Retention     ER follow up. Patient would like catheter removed now after his surgery    Savannah Hay LPN

## 2024-04-24 NOTE — NURSING NOTE
Chief Complaint   Patient presents with    Urinary Retention     ER follow up. Patient would like catheter removed now after his surgery    Bladder scan after voiding was 999ml DR LARRY GAVE ORDERS TO PLACED 20 Ukrainian COUDE CATHETER   Sylvester Maurice [facility]

## 2024-04-24 NOTE — NURSING NOTE
Chief Complaint   Patient presents with    Urinary Retention     ER follow up. Patient would like catheter removed now after his surgery    5mL 2% lidocaine hydrochloride Urojet instilled into urethra.    NDC# [address]-2  Lot #: [id number]  Expiration Date:  10-25  Sylvester Maurice [facility]

## 2024-04-24 NOTE — LETTER
4/24/2024       RE: Sylvain Moss  [address] [place]  [facility] [address]     Dear Colleague,    Thank you for referring your patient, Sylvain Moss, to the [facility] [place] [facility]. Please see a copy of my visit note below.    [facility]  Main Office: [address]  [address]  [place] [address]       CHIEF COMPLAINT:  Urinary retention, gross hematuria    HISTORY:   78 year old male who presents with a history of urinary retention, gross hematuria    Had right total knee replacement 4/16/2024 under spinal. In postop he had retention and had straight cath for large volumes 700 and 1000 ml apparently, then had indwelling Pleitez placed prior to discharge. Returned to ER 4/17/2024 with hematuria so had catheter replaced. Then on 4/19/2024 had issues with catheter dysfunction, leakage around catheter. The catheter was removed and replaced with 3-way catheter, irrigated.     Prior to this episode his only urinary symptoms were urgency, which was quite manageable. Denies any slow/weak stream      PAST MEDICAL HISTORY:   Past Medical History:   Diagnosis Date    Bipolar affective disorder (H)     Elevated serum cholesterol     Hypertension     Lipoma of skin and subcutaneous tissue     Nocturia     Obese     Prostate cancer (H)     Thyroid disease        PAST SURGICAL HISTORY:   Past Surgical History:   Procedure Laterality Date    ANKLE SURGERY Right     ARTHROPLASTY KNEE Right 4/16/2024    Procedure: Right Total Knee Arthroplasty;  Surgeon: Chris Gamboa MD;  Location: [facility] OR    ARTHROSCOPY KNEE BILATERAL Bilateral        FAMILY HISTORY:   Family History   Problem Relation Age of Onset    Cerebral aneurysm Mother     Heart Disease Father     Pulmonary Embolism Sister        SOCIAL HISTORY:   Social History     Tobacco Use    Smoking status: Never    Smokeless tobacco: Not on file   Substance Use Topics    Alcohol use:  Yes     Comment: moderate          Allergies   Allergen Reactions    Penicillins          Current Outpatient Medications:     acetaminophen (TYLENOL) 325 MG tablet, Take 2 tablets (650 mg) by mouth every 4 hours as needed for other (mild pain), Disp: 100 tablet, Rfl: 0    aspirin 81 MG EC tablet, Take 1 tablet (81 mg) by mouth 2 times daily, Disp: 60 tablet, Rfl: 0    ATORVASTATIN CALCIUM PO, Take 20 mg by mouth at bedtime, Disp: , Rfl:     Calcium-Magnesium-Zinc 333-133-5 MG TABS per tablet, Take 1 tablet by mouth daily, Disp: , Rfl:     fluticasone (FLONASE) 50 MCG/ACT nasal spray, Spray 2 sprays into both nostrils at bedtime, Disp: , Rfl:     hydrochlorothiazide (HYDRODIURIL) 25 MG tablet, Take 25 mg by mouth daily, Disp: , Rfl:     HYDROmorphone (DILAUDID) 2 MG tablet, Take 1-2 tablets (2-4 mg) by mouth every 4 hours as needed for moderate to severe pain, Disp: 31 tablet, Rfl: 0    latanoprost (XALATAN) 0.005 % ophthalmic solution, Place 1 drop into both eyes at bedtime, Disp: , Rfl:     Levothyroxine Sodium (SYNTHROID PO), Take 50 mcg by mouth daily, Disp: , Rfl:     lisinopril (ZESTRIL) 40 MG tablet, Take 40 mg by mouth daily, Disp: , Rfl:     Multiple Vitamins-Minerals (PRESERVISION AREDS 2 PO), Take 2 tablets by mouth daily, Disp: , Rfl:     multivitamin w/minerals (THERA-VIT-M) tablet, Take 1 tablet by mouth daily, Disp: , Rfl:     neomycin-polymyxin-hydrocortisone (CORTISPORIN) 3.5-58898-5 otic suspension, Place 3 drops into the right ear 4 times daily, Disp: 10 mL, Rfl: 0    Omega-3 Fatty Acids (FISH OIL) 1200 MG capsule, Take 4,800 mg by mouth daily, Disp: , Rfl:     oxyBUTYnin (DITROPAN) 5 MG tablet, Take 1 tablet (5 mg) by mouth 3 times daily as needed for bladder spasms, Disp: 20 tablet, Rfl: 0    pramipexole (MIRAPEX) 0.25 MG tablet, Take 0.25 mg by mouth at bedtime, Disp: , Rfl:     senna-docusate (SENOKOT-S/PERICOLACE) 8.6-50 MG tablet, Take 1-2 tablets by mouth 2 times daily Take while on oral  "narcotics to prevent or treat constipation., Disp: 30 tablet, Rfl: 0    tamsulosin (FLOMAX) 0.4 MG capsule, Take 1 capsule (0.4 mg) by mouth daily for 14 days, Disp: 14 capsule, Rfl: 0    Review Of Systems:  Skin: No rash, pruritis, or skin pigmentation  Eyes: No changes in vision  Ears/Nose/Throat: No changes in hearing, no nosebleeds  Respiratory: No shortness of breath, dyspnea on exertion, cough, or hemoptysis  Cardiovascular: No chest pain or palpitations  Gastrointestinal: No diarrhea or constipation. No abdominal pain. No hematochezia  Genitourinary: see HPI  Musculoskeletal: No pain or swelling of joints, normal range of motion  Neurologic: No weakness or tremors  Psychiatric: No recent changes in memory or mood  Hematologic/Lymphatic/Immunologic: No easy bruising or enlarged lymph nodes  Endocrine: No weight gain or loss      PHYSICAL EXAM:    /68 (BP Location: Left arm, Patient Position: Sitting, Cuff Size: Adult Regular)   Pulse 87   Ht 1.829 m (6')   Wt 130.6 kg (288 lb)   SpO2 97%   BMI 39.06 kg/m    General appearance: In NAD, conversant  HEENT: Normocephalic and atraumatic, anicteric sclera  Cardiovascular: Not examined  Respiratory: normal, non-labored breathing  Gastrointestinal: negative, Abdomen soft, non-tender, and non-distended.   Musculoskeletal: right knee wrap in place  Peripheral Vascular/extremity: No peripheral edema  Skin: Normal temperature, turgor, and texture. No rash  Psychiatric: Appropriate affect, alert and oriented to person, place, and time    Penis: uribe in place, with clear yellow urine    Cystoscopy: Not done      UA RESULTS:  No results for input(s): \"COLOR\", \"APPEARANCE\", \"URINEGLC\", \"URINEBILI\", \"URINEKETONE\", \"SG\", \"UBLD\", \"URINEPH\", \"PROTEIN\", \"UROBILINOGEN\", \"NITRITE\", \"LEUKEST\", \"RBCU\", \"WBCU\" in the last 58981 hours.    Bladder Scan:     Other Labs:      Imaging Studies: None      CLINICAL IMPRESSION:   79 yo M with urinary retention after spinal " anesthesia, gross hematuria likely from traumatic cath    Notably there is a listing of prostate cancer in his past medical history, but patient denies this. This was added by a nurse on April 12 and he denies seeing anyone on that day. I will remove it    Re: gross hematuria, probably from traumatic cath, will return for cysto for evaluation    He has been on tamsulosin since catheter placement, will continue indefinitely for presumed BPH with retention    PLAN:   Trial of void today  Continue tamsulosin 0.4 mg daily indefinitely (refills placed, Rx drug management)  Return for cystoscopy      Tanner Wiseman MD   [facility] Urology  [phone] clinic phone

## 2024-04-24 NOTE — NURSING NOTE
Chief Complaint   Patient presents with    Urinary Retention     ER follow up. Patient would like catheter removed now after his surgery    Patient presents to clinic for a trial of void per MD LARRY order. Patient properly identified and procedure explained to patient. Patient's leg-bag emptied, *50cc's clear-yellow urine drained from patient's catheter. Catheter was then detached from leg-bag and sterile cysto tubing inserted into catheter opening. Via gravity 400cc's sterile water was gently instilled with brief pauses into bladder. Patient tolerated fairly well and then catheter balloon was completely deflated.22FRENCH Pleitez catheter was removed from bladder. Patient was able to successfully void 250*cc's following procedure.    Catheter insertion documentation on 4/24/2024:    Sylvain ORELLANA Moss presents to the clinic for catheter insertion.  Reason for insertion: urinary retention  Order has been verified. DR LARRY  Catheter successfully inserted into the urethral meatus in the usual sterile fashion without immediate complication.  Type of catheter placed: 20FRENCH Coude catheter  Urine is pink in color.  1500 cc's of urine output returned.  Balloon was filled with 10cc of normal saline.  Securement device placed for the catheter.  The patient tolerated the procedure and was instructed to drink plenty of water    Sylvester Maurice, CMA

## 2024-04-24 NOTE — PROGRESS NOTES
[facility]rology [facility]  [address]  [address]  [place] [address]       CHIEF COMPLAINT:  Urinary retention, gross hematuria    HISTORY:   78 year old male who presents with a history of urinary retention, gross hematuria    Had right total knee replacement 4/16/2024 under spinal. In postop he had retention and had straight cath for large volumes 700 and 1000 ml apparently, then had indwelling Pleitez placed prior to discharge. Returned to ER 4/17/2024 with hematuria so had catheter replaced. Then on 4/19/2024 had issues with catheter dysfunction, leakage around catheter. The catheter was removed and replaced with 3-way catheter, irrigated.     Prior to this episode his only urinary symptoms were urgency, which was quite manageable. Denies any slow/weak stream      PAST MEDICAL HISTORY:   Past Medical History:   Diagnosis Date    Bipolar affective disorder (H)     Elevated serum cholesterol     Hypertension     Lipoma of skin and subcutaneous tissue     Nocturia     Obese     Prostate cancer (H)     Thyroid disease        PAST SURGICAL HISTORY:   Past Surgical History:   Procedure Laterality Date    ANKLE SURGERY Right     ARTHROPLASTY KNEE Right 4/16/2024    Procedure: Right Total Knee Arthroplasty;  Surgeon: Chris Gamboa MD;  Location: [facility] OR    ARTHROSCOPY KNEE BILATERAL Bilateral        FAMILY HISTORY:   Family History   Problem Relation Age of Onset    Cerebral aneurysm Mother     Heart Disease Father     Pulmonary Embolism Sister        SOCIAL HISTORY:   Social History     Tobacco Use    Smoking status: Never    Smokeless tobacco: Not on file   Substance Use Topics    Alcohol use: Yes     Comment: moderate          Allergies   Allergen Reactions    Penicillins          Current Outpatient Medications:     acetaminophen (TYLENOL) 325 MG tablet, Take 2 tablets (650 mg) by mouth every 4 hours as needed for other (mild pain), Disp: 100 tablet, Rfl: 0    aspirin 81 MG EC tablet, Take 1 tablet  (81 mg) by mouth 2 times daily, Disp: 60 tablet, Rfl: 0    ATORVASTATIN CALCIUM PO, Take 20 mg by mouth at bedtime, Disp: , Rfl:     Calcium-Magnesium-Zinc 333-133-5 MG TABS per tablet, Take 1 tablet by mouth daily, Disp: , Rfl:     fluticasone (FLONASE) 50 MCG/ACT nasal spray, Spray 2 sprays into both nostrils at bedtime, Disp: , Rfl:     hydrochlorothiazide (HYDRODIURIL) 25 MG tablet, Take 25 mg by mouth daily, Disp: , Rfl:     HYDROmorphone (DILAUDID) 2 MG tablet, Take 1-2 tablets (2-4 mg) by mouth every 4 hours as needed for moderate to severe pain, Disp: 31 tablet, Rfl: 0    latanoprost (XALATAN) 0.005 % ophthalmic solution, Place 1 drop into both eyes at bedtime, Disp: , Rfl:     Levothyroxine Sodium (SYNTHROID PO), Take 50 mcg by mouth daily, Disp: , Rfl:     lisinopril (ZESTRIL) 40 MG tablet, Take 40 mg by mouth daily, Disp: , Rfl:     Multiple Vitamins-Minerals (PRESERVISION AREDS 2 PO), Take 2 tablets by mouth daily, Disp: , Rfl:     multivitamin w/minerals (THERA-VIT-M) tablet, Take 1 tablet by mouth daily, Disp: , Rfl:     neomycin-polymyxin-hydrocortisone (CORTISPORIN) 3.5-47283-2 otic suspension, Place 3 drops into the right ear 4 times daily, Disp: 10 mL, Rfl: 0    Omega-3 Fatty Acids (FISH OIL) 1200 MG capsule, Take 4,800 mg by mouth daily, Disp: , Rfl:     oxyBUTYnin (DITROPAN) 5 MG tablet, Take 1 tablet (5 mg) by mouth 3 times daily as needed for bladder spasms, Disp: 20 tablet, Rfl: 0    pramipexole (MIRAPEX) 0.25 MG tablet, Take 0.25 mg by mouth at bedtime, Disp: , Rfl:     senna-docusate (SENOKOT-S/PERICOLACE) 8.6-50 MG tablet, Take 1-2 tablets by mouth 2 times daily Take while on oral narcotics to prevent or treat constipation., Disp: 30 tablet, Rfl: 0    tamsulosin (FLOMAX) 0.4 MG capsule, Take 1 capsule (0.4 mg) by mouth daily for 14 days, Disp: 14 capsule, Rfl: 0    Review Of Systems:  Skin: No rash, pruritis, or skin pigmentation  Eyes: No changes in vision  Ears/Nose/Throat: No changes in  "hearing, no nosebleeds  Respiratory: No shortness of breath, dyspnea on exertion, cough, or hemoptysis  Cardiovascular: No chest pain or palpitations  Gastrointestinal: No diarrhea or constipation. No abdominal pain. No hematochezia  Genitourinary: see HPI  Musculoskeletal: No pain or swelling of joints, normal range of motion  Neurologic: No weakness or tremors  Psychiatric: No recent changes in memory or mood  Hematologic/Lymphatic/Immunologic: No easy bruising or enlarged lymph nodes  Endocrine: No weight gain or loss      PHYSICAL EXAM:    /68 (BP Location: Left arm, Patient Position: Sitting, Cuff Size: Adult Regular)   Pulse 87   Ht 1.829 m (6')   Wt 130.6 kg (288 lb)   SpO2 97%   BMI 39.06 kg/m    General appearance: In NAD, conversant  HEENT: Normocephalic and atraumatic, anicteric sclera  Cardiovascular: Not examined  Respiratory: normal, non-labored breathing  Gastrointestinal: negative, Abdomen soft, non-tender, and non-distended.   Musculoskeletal: right knee wrap in place  Peripheral Vascular/extremity: No peripheral edema  Skin: Normal temperature, turgor, and texture. No rash  Psychiatric: Appropriate affect, alert and oriented to person, place, and time    Penis: uribe in place, with clear yellow urine    Cystoscopy: Not done      UA RESULTS:  No results for input(s): \"COLOR\", \"APPEARANCE\", \"URINEGLC\", \"URINEBILI\", \"URINEKETONE\", \"SG\", \"UBLD\", \"URINEPH\", \"PROTEIN\", \"UROBILINOGEN\", \"NITRITE\", \"LEUKEST\", \"RBCU\", \"WBCU\" in the last 02694 hours.    Bladder Scan:     Other Labs:      Imaging Studies: None      CLINICAL IMPRESSION:   79 yo M with urinary retention after spinal anesthesia, gross hematuria likely from traumatic cath    Notably there is a listing of prostate cancer in his past medical history, but patient denies this. This was added by a nurse on April 12 and he denies seeing anyone on that day. I will remove it    Re: gross hematuria, probably from traumatic cath, will return for " cysto for evaluation    He has been on tamsulosin since catheter placement, will continue indefinitely for presumed BPH with retention    PLAN:   Trial of void today  Continue tamsulosin 0.4 mg daily indefinitely (refills placed, Rx drug management)  Return for cystoscopy      Tanner Wiseman MD   [facility]  [phone] clinic phone

## 2024-04-29 ENCOUNTER — ALLIED HEALTH/NURSE VISIT (OUTPATIENT)
Dept: UROLOGY | Facility: CLINIC | Age: 79
End: 2024-04-29
Payer: COMMERCIAL

## 2024-04-29 DIAGNOSIS — R33.9 URINARY RETENTION: ICD-10-CM

## 2024-04-29 DIAGNOSIS — Z79.2 PROPHYLACTIC ANTIBIOTIC: Primary | ICD-10-CM

## 2024-04-29 PROCEDURE — 51702 INSERT TEMP BLADDER CATH: CPT

## 2024-04-29 RX ORDER — CIPROFLOXACIN 500 MG/1
500 TABLET, FILM COATED ORAL ONCE
Qty: 1 TABLET | Refills: 0 | Status: SHIPPED | OUTPATIENT
Start: 2024-04-29 | End: 2024-04-29

## 2024-04-29 RX ORDER — LIDOCAINE HYDROCHLORIDE 20 MG/ML
JELLY TOPICAL ONCE
Status: COMPLETED | OUTPATIENT
Start: 2024-04-29 | End: 2024-04-29

## 2024-04-29 RX ADMIN — LIDOCAINE HYDROCHLORIDE 5 ML: 20 JELLY TOPICAL at 10:16

## 2024-04-29 NOTE — PROGRESS NOTES
Sylvain Moss comes into clinic today for Urinary Retention  at the request of Dr Wiseman  Ordering Provider for Trial of Void.        This service provided today was under the supervising provider of the day Dr Anderson , who was available if needed.        Here for a TOV  . Urine is clear in drainage bag . 300 ml  instilled of sterile water  by slow drip. Patient had s strong urge  at 275 ml  filling stopped  and then resumed for total of 300  ml . At that time patient had a very strong urged and felt  like he needed to void . Uribe was removed after balloon deflated  and patient was asked to void     Patient intially voided 50 ml    and wanted to wait and keep trying after about 20 min patient had voided  a  total  of 60 ml  but not enough to keep uribe out . Informed patient we would need to replace  uribe  until cystoscopy appointment next week       Catheter insertion documentation on 4/29/2024:    Sylvain Moss presents to the clinic for catheter insertion.  Reason for insertion: urinary retention  Order has been verified. YES  Catheter successfully inserted into the urethral meatus in the usual sterile fashion without immediate complication.  Type of catheter placed: 16 Haitian Coude catheter  Urine is yellow in color.  320 cc's of urine output returned.  Balloon was filled with 10cc's of normal saline.  Securement device placed for the catheter.  The patient tolerated the procedure and was instructed to Stay on flomax   and take one Cipro today . Keep next weeks appointment  as planned     5mL 2% lidocaine hydrochloride Urojet instilled into urethra.    NDC# [address]-6  Lot #: [id number]  Expiration Date:  11/25      Savannah Stern LPN

## 2024-05-06 ENCOUNTER — OFFICE VISIT (OUTPATIENT)
Dept: UROLOGY | Facility: CLINIC | Age: 79
End: 2024-05-06
Payer: COMMERCIAL

## 2024-05-06 VITALS
WEIGHT: 288 LBS | DIASTOLIC BLOOD PRESSURE: 80 MMHG | OXYGEN SATURATION: 97 % | HEIGHT: 72 IN | BODY MASS INDEX: 39.01 KG/M2 | HEART RATE: 82 BPM | SYSTOLIC BLOOD PRESSURE: 143 MMHG

## 2024-05-06 DIAGNOSIS — R33.9 URINARY RETENTION: Primary | ICD-10-CM

## 2024-05-06 DIAGNOSIS — R31.0 GROSS HEMATURIA: ICD-10-CM

## 2024-05-06 DIAGNOSIS — Z79.2 PROPHYLACTIC ANTIBIOTIC: ICD-10-CM

## 2024-05-06 PROCEDURE — 51798 US URINE CAPACITY MEASURE: CPT | Performed by: STUDENT IN AN ORGANIZED HEALTH CARE EDUCATION/TRAINING PROGRAM

## 2024-05-06 PROCEDURE — 99213 OFFICE O/P EST LOW 20 MIN: CPT | Mod: 25 | Performed by: STUDENT IN AN ORGANIZED HEALTH CARE EDUCATION/TRAINING PROGRAM

## 2024-05-06 PROCEDURE — 52000 CYSTOURETHROSCOPY: CPT | Performed by: STUDENT IN AN ORGANIZED HEALTH CARE EDUCATION/TRAINING PROGRAM

## 2024-05-06 RX ORDER — FINASTERIDE 5 MG/1
5 TABLET, FILM COATED ORAL DAILY
Qty: 90 TABLET | Refills: 3 | Status: SHIPPED | OUTPATIENT
Start: 2024-05-06

## 2024-05-06 RX ORDER — LIDOCAINE HYDROCHLORIDE 20 MG/ML
JELLY TOPICAL ONCE
Status: COMPLETED | OUTPATIENT
Start: 2024-05-06 | End: 2024-05-06

## 2024-05-06 RX ORDER — CIPROFLOXACIN 500 MG/1
500 TABLET, FILM COATED ORAL ONCE
Qty: 1 TABLET | Refills: 0 | Status: SHIPPED | OUTPATIENT
Start: 2024-05-06 | End: 2024-05-06

## 2024-05-06 RX ORDER — TAMSULOSIN HYDROCHLORIDE 0.4 MG/1
0.8 CAPSULE ORAL EVERY EVENING
Qty: 180 CAPSULE | Refills: 3 | Status: SHIPPED | OUTPATIENT
Start: 2024-05-06

## 2024-05-06 RX ADMIN — LIDOCAINE HYDROCHLORIDE 5 ML: 20 JELLY TOPICAL at 15:43

## 2024-05-06 ASSESSMENT — PAIN SCALES - GENERAL: PAINLEVEL: NO PAIN (0)

## 2024-05-06 NOTE — NURSING NOTE
Catheter removal documentation on 5/6/2024:    Sylvain Moss presents to the clinic for catheter removal.  Reason for removal: cystoscopy  Order has been verified. DR LARRY  Catheter successfully removed at 5:10 PM without immediate complication.  300 cc's of urine present in the catheter bag.  Urethral meatus is free of secretions and encrustation.  The patient is afebrile.  The patient tolerated the procedure and was instructed to  DR PLENTY OF WATER    Sylvester Maurice CMA     Catheter insertion documentation on 5/6/2024:    Sylvain Moss presents to the clinic for catheter insertion.  Reason for insertion: urinary retention  Order has been verified. DR LARRY  Catheter successfully inserted into the urethral meatus in the usual sterile fashion without immediate complication.  Type of catheter placed: 16 Turkish Coude catheter  Urine is clear in color.  400 cc's of urine output returned.  Balloon was filled with 10cc of normal saline.  Securement device placed for the catheter.  The patient tolerated the procedure and was instructed to drink plenty  water and also  antibiotic after leaving the clinic .    Sylvester Maurice CMA

## 2024-05-06 NOTE — NURSING NOTE
Chief Complaint   Patient presents with    Urinary Retention     Here in clinic today for a cystoscopy     Prior to the start of the procedure DR LARRY and with procedural staff participation, I verbally confirmed the patient s identity using two indicators, relevant allergies, that the procedure was appropriate and matched the consent or emergent situation, and that the correct equipment/implants were available. Immediately prior to starting the procedure I conducted the Time Out with the procedural staff and re-confirmed the patient s name, procedure, and site/side. I have wiped the patient off with the povidone-Iodine solution, draped them,  used Lidocaine hydrochloride jelly, and instilled sterile water into the bladder. (The Joint Commission universal protocol was followed.)  Yes    5mL 2% lidocaine hydrochloride Urojet instilled into urethra.    NDC# [address]-9  Lot #: [id number]  Expiration Date:  11-25  Sylvester Maurice CMA

## 2024-05-06 NOTE — PATIENT INSTRUCTIONS

## 2024-05-06 NOTE — PROGRESS NOTES
CHIEF COMPLAINT   Sylvain Moss who is a 78 year old male returns today for follow-up of BPH with retention, gross hematuria    HPI   Sylvain Moss is a 78 year old male returns today for follow-up of BPH with retention, gross hematuria    See initial consult 4/24/2024. Note that on 4/19/2024 had total knee replacement under spinal, large volume urine retention afterwards.    He initially passed TOV 4/24/2024 but then had uribe replaced later that day for 1500 ml out. Failed TOV again on 4/29    He has been on tamsulosin 0.4 mg daily    PHYSICAL EXAM  Patient is a 78 year old  male   Vitals: Blood pressure (!) 143/80, pulse 82, height 1.829 m (6'), weight 130.6 kg (288 lb), SpO2 97%.  Body mass index is 39.06 kg/m .  General Appearance Adult:   Alert, no acute distress, oriented  HENT: throat/mouth:normal, good dentition  Lungs: no respiratory distress, or pursed lip breathing  Heart: No obvious jugular venous distension present  Abdomen: nondistended  Musculoskeltal: extremities normal, no peripheral edema  Skin: no suspicious lesions or rashes  Neuro: Alert, oriented, speech and mentation normal  Psych: affect and mood normal  Gait: Normal  : orthotopic and patent meatus    PRE-PROCEDURE DIAGNOSIS: gross hematuria, BPH with retention    POST-PROCEDURE DIAGNOSIS: gross hematuria, BPH with retention    PROCEDURE: Cystoscopy    DESCRIPTION OF PROCEDURE: After informed consent was obtained, the patient was brought to the procedure room where he was placed in the supine position with all pressure points well padded.  The penis was prepped and draped in sterile fashion. A flexible cystoscope was introduced through a well-lubricated urethra.     Anterior urethra normal  Prostatic urethra about 6 cm with trilobar obstructive hypertrophy. Prostatic apex has evidence of prior trauma  Bladder severe trabeculation with multiple cellules. Numerous areas of polypoid cystitis from catheter irritation.. No bladder stones.  No bladder tumors    The flexible cystoscope was removed and the findings were described to the patient.       ASSESSMENT and PLAN  78 year old male returns today for follow-up of BPH with retention, gross hematuria    Very large obstructive prostate with sequelae of chronic obstruction. He was unable to void after over 500 ml saline instilled into bladder, and did not have any urge at all. 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 Even with bladder outlet procedure, may not be able to spontaneously void. Briefly discussed greenlight laser PVP and aquablation as potential options    Will do max medical therapy with alpha blocker and 5ari. Increase tamsulosin to 0.8 mg daily, start finasteride (rx drug management)    Re: gross hematuria probably from traumatic cath, there is evidence of trauma at apex of prostate, uribe replaced to allow for healing    (Non video) urodynamics in 2 weeks (if cannot get UDS on by then, then do nurse visit TOV instead)  If he cannot urinate, intermittent self cath teaching 4 times a day  Return after urodynamics to discuss results, possible bladder outlet procedure      Tanner Wiseman MD   [facility]  [facility] Phone: [phone]

## 2024-05-06 NOTE — LETTER
5/6/2024       RE: Sylvain Moss  [address]  [facility] [address]     Dear Colleague,    Thank you for referring your patient, Sylvain Moss, to the [facility] at [facility]. Please see a copy of my visit note below.    CHIEF COMPLAINT   Sylvain Moss who is a 78 year old male returns today for follow-up of BPH with retention, gross hematuria    HPI   Sylvain Moss is a 78 year old male returns today for follow-up of BPH with retention, gross hematuria    See initial consult 4/24/2024. Note that on 4/19/2024 had total knee replacement under spinal, large volume urine retention afterwards.    He initially passed TOV 4/24/2024 but then had uribe replaced later that day for 1500 ml out. Failed TOV again on 4/29    He has been on tamsulosin 0.4 mg daily    PHYSICAL EXAM  Patient is a 78 year old  male   Vitals: Blood pressure (!) 143/80, pulse 82, height 1.829 m (6'), weight 130.6 kg (288 lb), SpO2 97%.  Body mass index is 39.06 kg/m .  General Appearance Adult:   Alert, no acute distress, oriented  HENT: throat/mouth:normal, good dentition  Lungs: no respiratory distress, or pursed lip breathing  Heart: No obvious jugular venous distension present  Abdomen: nondistended  Musculoskeltal: extremities normal, no peripheral edema  Skin: no suspicious lesions or rashes  Neuro: Alert, oriented, speech and mentation normal  Psych: affect and mood normal  Gait: Normal  : orthotopic and patent meatus    PRE-PROCEDURE DIAGNOSIS: gross hematuria, BPH with retention    POST-PROCEDURE DIAGNOSIS: gross hematuria, BPH with retention    PROCEDURE: Cystoscopy    DESCRIPTION OF PROCEDURE: After informed consent was obtained, the patient was brought to the procedure room where he was placed in the supine position with all pressure points well padded.  The penis was prepped and draped in sterile fashion. A flexible cystoscope was introduced  through a well-lubricated urethra.     Anterior urethra normal  Prostatic urethra about 6 cm with trilobar obstructive hypertrophy. Prostatic apex has evidence of prior trauma  Bladder severe trabeculation with multiple cellules. Numerous areas of polypoid cystitis from catheter irritation.. No bladder stones. No bladder tumors    The flexible cystoscope was removed and the findings were described to the patient.       ASSESSMENT and PLAN  78 year old male returns today for follow-up of BPH with retention, gross hematuria    Very large obstructive prostate with sequelae of chronic obstruction. He was unable to void after over 500 ml saline instilled into bladder, and did not have any urge at all. 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 Even with bladder outlet procedure, may not be able to spontaneously void. Briefly discussed greenlight laser PVP and aquablation as potential options    Will do max medical therapy with alpha blocker and 5ari. Increase tamsulosin to 0.8 mg daily, start finasteride (rx drug management)    Re: gross hematuria probably from traumatic cath, there is evidence of trauma at apex of prostate, uribe replaced to allow for healing    (Non video) urodynamics in 2 weeks (if cannot get UDS on by then, then do nurse visit TOV instead)  If he cannot urinate, intermittent self cath teaching 4 times a day  Return after urodynamics to discuss results, possible bladder outlet procedure      Tanner Wiseman MD   [facility]  [facility] Phone: [phone]

## 2024-05-08 ENCOUNTER — TELEPHONE (OUTPATIENT)
Dept: UROLOGY | Facility: CLINIC | Age: 79
End: 2024-05-08
Payer: COMMERCIAL

## 2024-05-08 NOTE — TELEPHONE ENCOUNTER
Patient called nurse line and BARI. Returned phone call and spoke with patient. He wanted to know if he should continue his 8 glasses of fluid. Explained that he could drink 8 8 ounce glasses before urodynamics test.    Yue Traylor LPN

## 2024-05-08 NOTE — TELEPHONE ENCOUNTER
"\"I don't see a problem with a moderate amount of alcohol and finasteride   \"Called patient and informed.     Yue Traylor LPN    "

## 2024-05-17 ENCOUNTER — ALLIED HEALTH/NURSE VISIT (OUTPATIENT)
Dept: UROLOGY | Facility: CLINIC | Age: 79
End: 2024-05-17
Payer: COMMERCIAL

## 2024-05-17 DIAGNOSIS — R33.9 URINARY RETENTION: Primary | ICD-10-CM

## 2024-05-17 PROCEDURE — 51784 ANAL/URINARY MUSCLE STUDY: CPT | Performed by: PHYSICIAN ASSISTANT

## 2024-05-17 PROCEDURE — 51726 COMPLEX CYSTOMETROGRAM: CPT | Performed by: PHYSICIAN ASSISTANT

## 2024-05-17 RX ORDER — CIPROFLOXACIN 500 MG/1
500 TABLET, FILM COATED ORAL ONCE
Qty: 1 TABLET | Refills: 0 | Status: SHIPPED | OUTPATIENT
Start: 2024-05-17 | End: 2024-05-17

## 2024-05-17 NOTE — NURSING NOTE
Patient was shown SIC with proper technique, he will cath 4 times per day. Patient has permanent urinary retention. Patient anatomy requires him to use a coude catheter.  16 [place] magic 3 catheters given to patient. Order sent to [place].  Patient as increased to 6 times per day.  Alisa Oliver LPN

## 2024-05-17 NOTE — PROGRESS NOTES
PREPROCEDURE DIAGNOSES:    1. BPH with retention    POSTPROCEDURE DIAGNOSES:  BPH with retention  DO/DOI    PROCEDURE:    -Sterile urethral catheterization for measurement of postvoid residual urine volume.  -Complex filling cystometrogram with measurement of bladder and rectal pressures.  -Electromyography of the pelvic floor during urodynamics.    INDICATIONS FOR PROCEDURE:  Mr. Sylvain Moss is a pleasant 78 year old male with BPH with retention. Has chronic uribe placed. Urodynamic assessment is requested today by Dr. Wiseman to better characterize Mr. Sylvain Moss's voiding dysfunction.      VOIDING DIARY:  Not completed    DESCRIPTION OF PROCEDURE:  Risks, benefits, and alternatives to urodynamics were discussed with the patient and he wished to proceed.  Urodynamics are planned to better assess the primary etiology for Mr. Moss's urologic dysfunction.  Reports he is no longer taking oxybutynin 5mg daily.  After informed consent was obtained, the patient was taken to the procedure room where the study was initiated. Findings below.     PRE-STUDY UROFLOWMETRY:  Did not obtain uroflow prior to study given has uribe catheter in place. UA not obtained given likely show contamination given uribe placement. Patient denies symptoms of UTI today.    Next a 7F double-lumen urodynamics catheter was inserted into the bladder under sterile technique via the urethra.  A 7F abdominal manometry catheter was placed in the rectum.  EMG pads were placed on both sides of the anal verge.  The bladder was filled with sterile normal saline at 20 mL/minute and serial pressures were recorded. With coughing there was an appropriate rise in vesical and abdominal pressures with no change in detrusor pressure, confirming good study catheter placement.    DURING THE FILLING PHASE:  First sensation: 18 mL.  First Desire: 88 mL.  Strong Desire: 637 mL.  Maximum Capacity: 660 mL.    Uninhibited detrusor contractions: Had DO and DOI.    Compliance: Good.   Continence: No CARL. Did have multiple episodes of reporting strong urge followed by DOI throughout filling phase. Was unable to adequately measure volume of leakage, as most missed funnel/beaker, on to floor with each episode of incontinence. Was unable to interpret pressures at points of leakage due to malfunction of equipment/catheters.     DURING THE VOIDING PHASE:  Unable to interpret voiding phase as patient pulled out Pves catheter prior to attempting to void. He was also unable to void despite this. All catheters removed and allowed patient to stand to try and void for a uroflow. Unfortunately, unable to interpret uroflow as patient had minimal volume of 3mL recorded with attempt to void. Of note, when attempting to void, was able to intermittently void a minimal amount with each attempt. Most attempts were of very small, short voids that missed uroflow onto floor. Was cathed for 425mL at end of study.     FLUOROSCOPIC IMAGING OF THE BLADDER WAS NOT OBTAINED DURING URODYNAMICS GIVEN OUR LOCATION DOES NOT PROVIDE FLUOROSCOPY.    ASSESSMENT/PLAN:  Mr. Sylvain Moss is a pleasant 78 year old male with BPH and urinary retention who demonstrated the following findings today on urodynamic evaluation:    -Good bladder capacity (660 mL) with filling sensations.  -Good bladder compliance with DO/DOI. Had multiple episodes of reporting strong urge to void followed by DOI throughout filling phase. Was unable to interpret leak pressures due to malfunction of equipment/catheter. Was unable to measure volume of incontinence with each episode as missed funnel onto floor.   -No CARL.  - Unable to interpret voiding phase as patient pulled out Pves catheter prior to attempting to void. He was unable to void despite this. All catheters were then removed and allowed patient to stand to try and void for a uroflow. Unfortunately, unable to interpret uroflow as patient had minimal volume recorded on uroflow  with attempt to void. Of note, when attempting to void, was able to intermittently void a minimal amount with each attempt. Most attempts were of very small, short voids that missed uroflow onto floor. Was cathed for 425mL at end of study.     The patient will follow up as scheduled with Dr. Wiseman to further discuss today's study results and make plans for how best to proceed.      - A single ciprofloxacin was provided for UTI prophylaxis following completion of today's study per department protocol.  The risk of UTI with UDS is low at ~2.5-3%.      Thank you for allowing me to participate in the care of Mr. Sylvain Moss and please don't hesitate to contact me with any questions or concerns.        Rajani Mosher PA-C  Department of Urology

## 2024-05-20 ENCOUNTER — TELEPHONE (OUTPATIENT)
Dept: UROLOGY | Facility: CLINIC | Age: 79
End: 2024-05-20
Payer: COMMERCIAL

## 2024-05-20 NOTE — TELEPHONE ENCOUNTER
Health Call Center    Phone Message    May a detailed message be left on voicemail: yes     Reason for Call: Order(s): Other:   Reason for requested: will run short of catheters  Date needed: today  Provider name: Ivone    Pt is using 6 catheters per day vs 4.  He will need to stop by today at [place] office to  more or he will run out.  Please change order to reflect 6 per day.  Please call pt when Catheters are ready to be picked up today. Thank you!    Action Taken: Message routed to:  [facility] [place] [facility]    Travel Screening: Not Applicable

## 2024-05-22 ENCOUNTER — OFFICE VISIT (OUTPATIENT)
Dept: UROLOGY | Facility: CLINIC | Age: 79
End: 2024-05-22
Payer: COMMERCIAL

## 2024-05-22 VITALS
DIASTOLIC BLOOD PRESSURE: 60 MMHG | WEIGHT: 285 LBS | HEART RATE: 75 BPM | HEIGHT: 73 IN | BODY MASS INDEX: 37.77 KG/M2 | OXYGEN SATURATION: 93 % | SYSTOLIC BLOOD PRESSURE: 134 MMHG

## 2024-05-22 DIAGNOSIS — N40.1 BENIGN PROSTATIC HYPERPLASIA WITH URINARY RETENTION: Primary | ICD-10-CM

## 2024-05-22 DIAGNOSIS — R33.8 BENIGN PROSTATIC HYPERPLASIA WITH URINARY RETENTION: Primary | ICD-10-CM

## 2024-05-22 PROCEDURE — 99213 OFFICE O/P EST LOW 20 MIN: CPT | Performed by: STUDENT IN AN ORGANIZED HEALTH CARE EDUCATION/TRAINING PROGRAM

## 2024-05-22 ASSESSMENT — PAIN SCALES - GENERAL: PAINLEVEL: NO PAIN (0)

## 2024-05-22 NOTE — LETTER
5/22/2024       RE: Sylvain Moss  [address]  [facility] [address]     Dear Colleague,    Thank you for referring your patient, Sylvain Moss, to the [facility] at [facility]. Please see a copy of my visit note below.    CHIEF COMPLAINT   Sylvain Moss who is a 78 year old male returns today for follow-up of BPH with retention, gross hematuria.      HPI   Sylvain Moss is a 78 year old male returns today for follow-up of BPH with retention, gross hematuria    Cysto 5/6/2024 w/ large prostate with trilobar obstructive hypertrophy and sequelae of chronic obstruction. Was put on max medical therapy with max dose alpha blocker and started 5ARI    Underwent UDS 5/17/2024  -Good bladder capacity (660 mL) with filling sensations.  -Good bladder compliance with DO/DOI. Had multiple episodes of reporting strong urge to void followed by DOI throughout filling phase. Was unable to interpret leak pressures due to malfunction of equipment/catheter. Was unable to measure volume of incontinence with each episode as missed funnel onto floor.   -No CARL.  - Unable to interpret voiding phase as patient pulled out Pves catheter prior to attempting to void. He was unable to void despite this. All catheters were then removed and allowed patient to stand to try and void for a uroflow. Unfortunately, unable to interpret uroflow as patient had minimal volume recorded on uroflow with attempt to void. Of note, when attempting to void, was able to intermittently void a minimal amount with each attempt. Most attempts were of very small, short voids that missed uroflow onto floor. Was cathed for 425mL at end of study.     He continues intermittent self cath up to 6 times a day since the UDS last week. He is voiding small amounts in between cath, maybe 100 ml and then drains another 500 ml with catheterization    Patient and his wife are still sexually  "active    PHYSICAL EXAM  Patient is a 78 year old  male   Vitals: Blood pressure 134/60, pulse 75, height 1.854 m (6' 1\"), weight 129.3 kg (285 lb), SpO2 93%.  Body mass index is 37.6 kg/m .  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 I am certainly hopeful that a bladder outlet procedure would allow him to spontaneously void. I again discussed Greenlight laser photovaporization vs. Aquablation as preferred outlet modalities. Also briefly discussed prostatic arterial embolization. Note that Aquablation has less chance of retrograde ejaculation as opposed to other bladder outlet modalities. Ideally patient would continue max medical therapy (although if he is worried about worsening sexual function, discontinue finasteride), continue intermittent self cath up to 6 times a day for now. If he is happy with with this regimen he could continue with this indefinitely    Re: high fluid intake, unclear why he is making 3-4L of urine a day. From a urologic standpoint certainly there is no reason why he has to drink this much.    - continue tamsulosin 0.8 mg daily and finasteride 5 mg daily (stop finasteride if worried about potential sexual dysfunction  - continue intermittent self cath ~6 times  - tentatively plan for Aquablation when available      Tanner Wiseman MD   [facility]rology  [facility] Phone: [phone]    "

## 2024-05-22 NOTE — PATIENT INSTRUCTIONS
- continue tamsulosin 0.8 mg daily and finasteride 5 mg daily (stop finasteride if worried about potential sexual dysfunction  - continue intermittent self cath ~6 times  - tentatively plan for Aquablation when available

## 2024-05-22 NOTE — PROGRESS NOTES
"CHIEF COMPLAINT   Sylvain Moss who is a 78 year old male returns today for follow-up of BPH with retention, gross hematuria.      HPI   Sylvain Moss is a 78 year old male returns today for follow-up of BPH with retention, gross hematuria    Cysto 5/6/2024 w/ large prostate with trilobar obstructive hypertrophy and sequelae of chronic obstruction. Was put on max medical therapy with max dose alpha blocker and started 5ARI    Underwent UDS 5/17/2024  -Good bladder capacity (660 mL) with filling sensations.  -Good bladder compliance with DO/DOI. Had multiple episodes of reporting strong urge to void followed by DOI throughout filling phase. Was unable to interpret leak pressures due to malfunction of equipment/catheter. Was unable to measure volume of incontinence with each episode as missed funnel onto floor.   -No CARL.  - Unable to interpret voiding phase as patient pulled out Pves catheter prior to attempting to void. He was unable to void despite this. All catheters were then removed and allowed patient to stand to try and void for a uroflow. Unfortunately, unable to interpret uroflow as patient had minimal volume recorded on uroflow with attempt to void. Of note, when attempting to void, was able to intermittently void a minimal amount with each attempt. Most attempts were of very small, short voids that missed uroflow onto floor. Was cathed for 425mL at end of study.     He continues intermittent self cath up to 6 times a day since the UDS last week. He is voiding small amounts in between cath, maybe 100 ml and then drains another 500 ml with catheterization    Patient and his wife are still sexually active    PHYSICAL EXAM  Patient is a 78 year old  male   Vitals: Blood pressure 134/60, pulse 75, height 1.854 m (6' 1\"), weight 129.3 kg (285 lb), SpO2 93%.  Body mass index is 37.6 kg/m .  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 I am certainly hopeful that a bladder outlet procedure would allow him to spontaneously void. I again discussed Greenlight laser photovaporization vs. Aquablation as preferred outlet modalities. Also briefly discussed prostatic arterial embolization. Note that Aquablation has less chance of retrograde ejaculation as opposed to other bladder outlet modalities. Ideally patient would continue max medical therapy (although if he is worried about worsening sexual function, discontinue finasteride), continue intermittent self cath up to 6 times a day for now. If he is happy with with this regimen he could continue with this indefinitely    Re: high fluid intake, unclear why he is making 3-4L of urine a day. From a urologic standpoint certainly there is no reason why he has to drink this much.    - continue tamsulosin 0.8 mg daily and finasteride 5 mg daily (stop finasteride if worried about potential sexual dysfunction  - continue intermittent self cath ~6 times  - tentatively plan for Aquablation when available      Tanner Wiseman MD   [facility]  [facility] Phone: [phone]

## 2024-06-01 ENCOUNTER — HEALTH MAINTENANCE LETTER (OUTPATIENT)
Age: 79
End: 2024-06-01

## 2024-07-03 ENCOUNTER — HOSPITAL ENCOUNTER (EMERGENCY)
Facility: CLINIC | Age: 79
Discharge: HOME | End: 2024-07-03
Attending: EMERGENCY MEDICINE | Admitting: EMERGENCY MEDICINE
Payer: COMMERCIAL

## 2024-07-03 VITALS
DIASTOLIC BLOOD PRESSURE: 71 MMHG | TEMPERATURE: 98.1 F | HEART RATE: 73 BPM | SYSTOLIC BLOOD PRESSURE: 119 MMHG | RESPIRATION RATE: 16 BRPM | OXYGEN SATURATION: 97 %

## 2024-07-03 DIAGNOSIS — R31.0 GROSS HEMATURIA: ICD-10-CM

## 2024-07-03 DIAGNOSIS — T83.091A OBSTRUCTION OF FOLEY CATHETER, INITIAL ENCOUNTER (H): ICD-10-CM

## 2024-07-03 PROCEDURE — 51798 US URINE CAPACITY MEASURE: CPT

## 2024-07-03 PROCEDURE — 99284 EMERGENCY DEPT VISIT MOD MDM: CPT | Mod: 25

## 2024-07-03 PROCEDURE — 51700 IRRIGATION OF BLADDER: CPT

## 2024-07-03 ASSESSMENT — ACTIVITIES OF DAILY LIVING (ADL)
ADLS_ACUITY_SCORE: 38

## 2024-07-03 NOTE — ED NOTES
No urine noted in patient's leg bag.  Pleitez catheter irrigated easily.  1100 ml of red tinged urine output. No clots noted in drainage.

## 2024-07-03 NOTE — ED TRIAGE NOTES
Pt had urinary procedure yesterday with catheter placed - blood clots noted at 1900, no urine output for past hour with leaking around catheter.      Triage Assessment (Adult)       Row Name 07/03/24 0043          Respiratory WDL    Respiratory WDL WDL        Cardiac WDL    Cardiac WDL WDL        Cognitive/Neuro/Behavioral WDL    Cognitive/Neuro/Behavioral WDL WDL

## 2024-07-03 NOTE — DISCHARGE INSTRUCTIONS
You can use the syringe and sterile water to flush the catheter if you need to.  Please follow-up with urology as planned and return to ER for any further concerns.

## 2024-07-03 NOTE — ED PROVIDER NOTES
Emergency Department Note      History of Present Illness     Chief Complaint   Catheter Problem      HPI   Sylvain Moss is a 78 year old male with a history of hypertension and reports to the ED for evaluation of catheter problem. The patient reports he was discharged after robotic aquablation surgery yesterday. Shares he noticed a couple blood clots yesterday. Today urine stopped flowing through the catheter, and a blood clot appeared and the flow resumed. States another blood clot appeared an hour later, followed by leaking around the catheter. States feeling urge to urinate.     Independent Historian   None    Review of External Notes   I reviewed the surgery note from 7/1/24 of robotic aquablation of the prostate.     Past Medical History     Medical History and Problem List   Bipolar affective disorder   Hypertension   Lipoma of skin   Obesity   Thyroid disease   Prostatic hyperplasia     Medications   Aspirin 81 mg   Proscar   Hydrodiuril   Synthroid   Zestril   Mirapex   Hydrochlorothiazide   Lipitor       Surgical History   Past Surgical History:   Procedure Laterality Date    ANKLE SURGERY Right     ARTHROPLASTY KNEE Right 4/16/2024    Procedure: Right Total Knee Arthroplasty;  Surgeon: Chris Gamboa MD;  Location: [facility] OR    ARTHROSCOPY KNEE BILATERAL Bilateral        Physical Exam     Patient Vitals for the past 24 hrs:   BP Temp Pulse Resp SpO2   07/03/24 0240 119/71 -- 73 -- 97 %   07/03/24 0045 (!) 145/68 -- -- -- --   07/03/24 0043 -- 98.1  F (36.7  C) 90 16 99 %     Physical Exam  General: Appears well-developed and well-nourished.   Head: No signs of trauma.   CV: Normal rate and regular rhythm.    Resp: Effort normal and breath sounds normal. No respiratory distress.   GI: Soft. There is no tenderness.  No rebound or guarding.  Normal bowel sounds.    :  Pleitez catheter in place with clear urine flowing.  No bleeding or leakage noted from meatus.    MSK: Normal range of motion. no edema.  No Calf tenderness.  Neuro: The patient is alert and oriented. Speech normal.  Skin: Skin is warm and dry. No rash noted.   Psych: normal mood and affect. behavior is normal.       Diagnostics     Lab Results   Labs Ordered and Resulted from Time of ED Arrival to Time of ED Departure - No data to display    Imaging   No orders to display     Independent Interpretation   None    ED Course      Medications Administered   Medications - No data to display    Procedures   Procedures     Discussion of Management   Urology, Dr. Beach     ED Course   ED Course as of 07/03/24 0732   Wed Jul 03, 2024 0144 I obtained history and examined the patient as noted above.    0317 I consulted Urology, Dr. Beach.    0320 I updated the patient.        Optional/Additional Documentation  None    Medical Decision Making / Diagnosis     CMS Diagnoses: None    MIPS       None    [facility]   Sylvain Moss is a 78 year old male presents after his catheter stopped flowing.  He had an aquablation done with urology and was discharged with a Pleitez catheter in place.  He states that intermittently last night he had a couple episodes where blood clots seem to cause a blockage.  Initially it would clear on its own, but then it did not and he started having leaking around the catheter.  We were able to flush the catheter and following this his Pleitez is flowing quite freely.  His urine actually appeared quite clear without gross hematuria after it was flushed.  I did discuss the case with Dr. Beach urology.  He recommended discharging the patient with a Pleitez syringe to flush it at home if he were to need to, and states that he or his office would call the patient in the morning to check In on him.  Patient was discharged home.    Disposition   The patient was discharged.     Diagnosis     ICD-10-CM    1. Obstruction of Pleitez catheter, initial encounter (H24)  T83.091A       2. Gross hematuria  R31.0            Discharge Medications    Discharge Medication List as of 7/3/2024  3:32 AM            Scribe Disclosure:  I, Umu Granger, am serving as a scribe at 1:41 AM on 7/3/2024 to document services personally performed by Dar Leone MD based on my observations and the provider's statements to me.        Dar Leone MD  07/03/24 0734

## 2024-09-18 ENCOUNTER — MEDICAL CORRESPONDENCE (OUTPATIENT)
Dept: HEALTH INFORMATION MANAGEMENT | Facility: CLINIC | Age: 79
End: 2024-09-18

## 2024-11-13 RX ORDER — AZITHROMYCIN 250 MG/1
TABLET, FILM COATED ORAL
Status: ON HOLD | COMMUNITY
End: 2024-11-19

## 2024-11-13 RX ORDER — CLINDAMYCIN HYDROCHLORIDE 150 MG/1
CAPSULE ORAL
COMMUNITY

## 2024-11-13 RX ORDER — ACETAMINOPHEN 500 MG
1000 TABLET ORAL EVERY 6 HOURS PRN
COMMUNITY

## 2024-11-13 RX ORDER — MELOXICAM 7.5 MG/1
7.5 TABLET ORAL DAILY
Status: ON HOLD | COMMUNITY
End: 2024-11-20

## 2024-11-13 NOTE — PROGRESS NOTES
PTA medications updated by Medication Scribe prior to surgery via phone call with patient (last doses completed by Nurse)     Medication history sources: Patient, Surescripts, and H&P  In the past week, patient estimated taking medication this percent of the time: Greater than 90%      Significant changes made to the medication list:  Patient reports no longer taking the following meds (med scribe removed from PTA med list): Flomax      Additional medication history information:   Patient was advised to bring: Flonase, Xalatan    Medication reconciliation completed by provider prior to medication history? No    Time spent in this activity: 35 minutes    The information provided in this note is only as accurate as the sources available at the time of update(s)      Prior to Admission medications    Medication Sig Last Dose Taking? Auth Provider Long Term End Date   acetaminophen (TYLENOL) 500 MG tablet Take 1,000 mg by mouth every 6 hours as needed for mild pain ([id number]).  Yes Reported, Patient     aspirin 81 MG EC tablet Take 1 tablet (81 mg) by mouth 2 times daily 11/11/2024 Morning Yes Chris Gamboa MD     ATORVASTATIN CALCIUM PO Take 20 mg by mouth at bedtime Bedtime Yes Reported, Patient     azithromycin (ZITHROMAX) 250 MG tablet 500 mg ([id number]) on day 1  then 250 mg for 4 days Morning Yes Reported, Patient     clindamycin (CLEOCIN) 150 MG capsule Take by mouth. 300mg 1 hours before dental appointment then 150 mg 6 hours after dental appointment. 10/28/2024 Yes Reported, Patient     diclofenac (VOLTAREN) 1 % topical gel Apply topically 4 times daily. 11/11/2024 Morning Yes Reported, Patient     finasteride (PROSCAR) 5 MG tablet Take 1 tablet (5 mg) by mouth daily 11/11/2024 Morning Yes Tanner Wiseman MD     fluticasone (FLONASE) 50 MCG/ACT nasal spray Spray 2 sprays into both nostrils at bedtime Bedtime Yes Reported, Patient     hydrochlorothiazide (HYDRODIURIL) 25 MG tablet Take 25 mg by  mouth daily Morning Yes Reported, Patient Yes    latanoprost (XALATAN) 0.005 % ophthalmic solution Place 1 drop into both eyes at bedtime Bedtime Yes Reported, Patient Yes    Levothyroxine Sodium (SYNTHROID PO) Take 50 mcg by mouth daily Morning Yes Reported, Patient     lisinopril (ZESTRIL) 40 MG tablet Take 40 mg by mouth daily  Yes Reported, Patient Yes    meloxicam (MOBIC) 7.5 MG tablet Take 7.5 mg by mouth daily. 11/11/2024 Evening Yes Reported, Patient No    Multiple Vitamins-Minerals (PRESERVISION AREDS 2 PO) Take 2 tablets by mouth daily 11/11/2024 Morning Yes Reported, Patient     multivitamin w/minerals (THERA-VIT-M) tablet Take 1 tablet by mouth daily 11/11/2024 Morning Yes Reported, Patient     Omega-3 Fatty Acids (FISH OIL) 1200 MG capsule Take 1,200 mg by mouth 2 times daily. 11/11/2024 Morning Yes Reported, Patient     pramipexole (MIRAPEX) 0.25 MG tablet Take 0.25 mg by mouth at bedtime Bedtime Yes Reported, Patient Yes        Medication history completed by: Gulshan Allen

## 2024-11-18 ENCOUNTER — ANESTHESIA EVENT (OUTPATIENT)
Dept: SURGERY | Facility: CLINIC | Age: 79
End: 2024-11-18
Payer: COMMERCIAL

## 2024-11-19 ENCOUNTER — ANESTHESIA (OUTPATIENT)
Dept: SURGERY | Facility: CLINIC | Age: 79
End: 2024-11-19
Payer: COMMERCIAL

## 2024-11-19 ENCOUNTER — APPOINTMENT (OUTPATIENT)
Dept: PHYSICAL THERAPY | Facility: CLINIC | Age: 79
End: 2024-11-19
Attending: ORTHOPAEDIC SURGERY
Payer: COMMERCIAL

## 2024-11-19 ENCOUNTER — HOSPITAL ENCOUNTER (OUTPATIENT)
Facility: CLINIC | Age: 79
Discharge: HOME | End: 2024-11-20
Attending: ORTHOPAEDIC SURGERY | Admitting: ORTHOPAEDIC SURGERY
Payer: COMMERCIAL

## 2024-11-19 ENCOUNTER — APPOINTMENT (OUTPATIENT)
Dept: GENERAL RADIOLOGY | Facility: CLINIC | Age: 79
End: 2024-11-19
Attending: ORTHOPAEDIC SURGERY
Payer: COMMERCIAL

## 2024-11-19 DIAGNOSIS — Z96.652 S/P TOTAL KNEE ARTHROPLASTY, LEFT: Primary | ICD-10-CM

## 2024-11-19 LAB
ANION GAP SERPL CALCULATED.3IONS-SCNC: 12 MMOL/L (ref 7–15)
BUN SERPL-MCNC: 18.3 MG/DL (ref 8–23)
CALCIUM SERPL-MCNC: 9.2 MG/DL (ref 8.8–10.4)
CHLORIDE SERPL-SCNC: 99 MMOL/L (ref 98–107)
CREAT SERPL-MCNC: 1 MG/DL (ref 0.67–1.17)
CREAT SERPL-MCNC: 1.01 MG/DL (ref 0.67–1.17)
EGFRCR SERPLBLD CKD-EPI 2021: 76 ML/MIN/1.73M2
EGFRCR SERPLBLD CKD-EPI 2021: 77 ML/MIN/1.73M2
GLUCOSE SERPL-MCNC: 99 MG/DL (ref 70–99)
HCO3 SERPL-SCNC: 23 MMOL/L (ref 22–29)
POTASSIUM SERPL-SCNC: 4.2 MMOL/L (ref 3.4–5.3)
POTASSIUM SERPL-SCNC: 4.3 MMOL/L (ref 3.4–5.3)
SODIUM SERPL-SCNC: 134 MMOL/L (ref 135–145)

## 2024-11-19 PROCEDURE — 258N000003 HC RX IP 258 OP 636: Performed by: ANESTHESIOLOGY

## 2024-11-19 PROCEDURE — 272N000001 HC OR GENERAL SUPPLY STERILE: Performed by: ORTHOPAEDIC SURGERY

## 2024-11-19 PROCEDURE — 999N000141 HC STATISTIC PRE-PROCEDURE NURSING ASSESSMENT: Performed by: ORTHOPAEDIC SURGERY

## 2024-11-19 PROCEDURE — 97161 PT EVAL LOW COMPLEX 20 MIN: CPT | Mod: GP

## 2024-11-19 PROCEDURE — 84132 ASSAY OF SERUM POTASSIUM: CPT | Performed by: ANESTHESIOLOGY

## 2024-11-19 PROCEDURE — 99221 1ST HOSP IP/OBS SF/LOW 40: CPT | Performed by: PHYSICIAN ASSISTANT

## 2024-11-19 PROCEDURE — 250N000009 HC RX 250: Performed by: NURSE ANESTHETIST, CERTIFIED REGISTERED

## 2024-11-19 PROCEDURE — 258N000001 HC RX 258: Performed by: ORTHOPAEDIC SURGERY

## 2024-11-19 PROCEDURE — 80048 BASIC METABOLIC PNL TOTAL CA: CPT | Performed by: PHYSICIAN ASSISTANT

## 2024-11-19 PROCEDURE — 370N000017 HC ANESTHESIA TECHNICAL FEE, PER MIN: Performed by: ORTHOPAEDIC SURGERY

## 2024-11-19 PROCEDURE — 250N000011 HC RX IP 250 OP 636: Mod: JW | Performed by: ANESTHESIOLOGY

## 2024-11-19 PROCEDURE — 360N000077 HC SURGERY LEVEL 4, PER MIN: Performed by: ORTHOPAEDIC SURGERY

## 2024-11-19 PROCEDURE — 97530 THERAPEUTIC ACTIVITIES: CPT | Mod: GP

## 2024-11-19 PROCEDURE — C1713 ANCHOR/SCREW BN/BN,TIS/BN: HCPCS | Performed by: ORTHOPAEDIC SURGERY

## 2024-11-19 PROCEDURE — 97116 GAIT TRAINING THERAPY: CPT | Mod: GP

## 2024-11-19 PROCEDURE — 250N000013 HC RX MED GY IP 250 OP 250 PS 637: Performed by: PHYSICIAN ASSISTANT

## 2024-11-19 PROCEDURE — 250N000011 HC RX IP 250 OP 636: Performed by: PHYSICIAN ASSISTANT

## 2024-11-19 PROCEDURE — 250N000011 HC RX IP 250 OP 636: Mod: JZ | Performed by: ORTHOPAEDIC SURGERY

## 2024-11-19 PROCEDURE — 250N000013 HC RX MED GY IP 250 OP 250 PS 637: Performed by: ORTHOPAEDIC SURGERY

## 2024-11-19 PROCEDURE — 710N000009 HC RECOVERY PHASE 1, LEVEL 1, PER MIN: Performed by: ORTHOPAEDIC SURGERY

## 2024-11-19 PROCEDURE — 258N000003 HC RX IP 258 OP 636: Performed by: ORTHOPAEDIC SURGERY

## 2024-11-19 PROCEDURE — C1776 JOINT DEVICE (IMPLANTABLE): HCPCS | Performed by: ORTHOPAEDIC SURGERY

## 2024-11-19 PROCEDURE — 82565 ASSAY OF CREATININE: CPT | Performed by: ANESTHESIOLOGY

## 2024-11-19 PROCEDURE — 82310 ASSAY OF CALCIUM: CPT | Performed by: PHYSICIAN ASSISTANT

## 2024-11-19 PROCEDURE — 250N000009 HC RX 250: Performed by: ORTHOPAEDIC SURGERY

## 2024-11-19 PROCEDURE — 36415 COLL VENOUS BLD VENIPUNCTURE: CPT | Performed by: ANESTHESIOLOGY

## 2024-11-19 PROCEDURE — 258N000003 HC RX IP 258 OP 636: Performed by: PHYSICIAN ASSISTANT

## 2024-11-19 PROCEDURE — 250N000011 HC RX IP 250 OP 636: Performed by: NURSE ANESTHETIST, CERTIFIED REGISTERED

## 2024-11-19 PROCEDURE — 258N000003 HC RX IP 258 OP 636: Performed by: NURSE ANESTHETIST, CERTIFIED REGISTERED

## 2024-11-19 PROCEDURE — 999N000065 XR KNEE PORT LEFT 1/2 VIEWS: Mod: LT

## 2024-11-19 DEVICE — LEGION PS NONPOROUS FEMORAL SZ 7 LT
Type: IMPLANTABLE DEVICE | Site: KNEE | Status: FUNCTIONAL
Brand: LEGION

## 2024-11-19 DEVICE — GENESIS II NON-POROUS TIBIAL                                    BASEPLATE SIZE 7 LEFT
Type: IMPLANTABLE DEVICE | Site: KNEE | Status: FUNCTIONAL
Brand: GENESIS II

## 2024-11-19 DEVICE — LEGION POSTERIOR STABILIZED HIGH                                    FLEX HIGHLY CROSS LINKED                                    POLYETHYLENE SIZE 7-8 11MM
Type: IMPLANTABLE DEVICE | Site: KNEE | Status: FUNCTIONAL
Brand: LEGION

## 2024-11-19 DEVICE — BONE CEMENT RADIOPAQUE SIMPLEX HV FULL DOSE 6194-1-001: Type: IMPLANTABLE DEVICE | Site: KNEE | Status: FUNCTIONAL

## 2024-11-19 DEVICE — GEN II RESURFACING PATELLA 38MM
Type: IMPLANTABLE DEVICE | Site: KNEE | Status: FUNCTIONAL
Brand: GENESIS II

## 2024-11-19 RX ORDER — DEXAMETHASONE SODIUM PHOSPHATE 4 MG/ML
4 INJECTION, SOLUTION INTRA-ARTICULAR; INTRALESIONAL; INTRAMUSCULAR; INTRAVENOUS; SOFT TISSUE
Status: DISCONTINUED | OUTPATIENT
Start: 2024-11-19 | End: 2024-11-19 | Stop reason: HOSPADM

## 2024-11-19 RX ORDER — HYDROMORPHONE HYDROCHLORIDE 2 MG/1
2-4 TABLET ORAL EVERY 4 HOURS PRN
Qty: 31 TABLET | Refills: 0 | Status: SHIPPED | OUTPATIENT
Start: 2024-11-19

## 2024-11-19 RX ORDER — ACETAMINOPHEN 325 MG/1
650 TABLET ORAL EVERY 4 HOURS PRN
Status: DISCONTINUED | OUTPATIENT
Start: 2024-11-21 | End: 2024-11-20 | Stop reason: HOSPADM

## 2024-11-19 RX ORDER — LIDOCAINE 40 MG/G
CREAM TOPICAL
Status: DISCONTINUED | OUTPATIENT
Start: 2024-11-19 | End: 2024-11-20 | Stop reason: HOSPADM

## 2024-11-19 RX ORDER — SODIUM CHLORIDE, SODIUM LACTATE, POTASSIUM CHLORIDE, CALCIUM CHLORIDE 600; 310; 30; 20 MG/100ML; MG/100ML; MG/100ML; MG/100ML
INJECTION, SOLUTION INTRAVENOUS CONTINUOUS
Status: DISCONTINUED | OUTPATIENT
Start: 2024-11-19 | End: 2024-11-20 | Stop reason: HOSPADM

## 2024-11-19 RX ORDER — AMOXICILLIN 250 MG
1 CAPSULE ORAL 2 TIMES DAILY
Status: DISCONTINUED | OUTPATIENT
Start: 2024-11-19 | End: 2024-11-20 | Stop reason: HOSPADM

## 2024-11-19 RX ORDER — LISINOPRIL 40 MG/1
40 TABLET ORAL DAILY
Status: DISCONTINUED | OUTPATIENT
Start: 2024-11-20 | End: 2024-11-20 | Stop reason: HOSPADM

## 2024-11-19 RX ORDER — TRANEXAMIC ACID 650 MG/1
1950 TABLET ORAL ONCE
Status: COMPLETED | OUTPATIENT
Start: 2024-11-19 | End: 2024-11-19

## 2024-11-19 RX ORDER — ACETAMINOPHEN 325 MG/1
975 TABLET ORAL EVERY 8 HOURS
Status: DISCONTINUED | OUTPATIENT
Start: 2024-11-19 | End: 2024-11-20 | Stop reason: HOSPADM

## 2024-11-19 RX ORDER — ASPIRIN 81 MG/1
81 TABLET ORAL 2 TIMES DAILY
Qty: 60 TABLET | Refills: 0 | Status: SHIPPED | OUTPATIENT
Start: 2024-11-19

## 2024-11-19 RX ORDER — SODIUM CHLORIDE, SODIUM LACTATE, POTASSIUM CHLORIDE, CALCIUM CHLORIDE 600; 310; 30; 20 MG/100ML; MG/100ML; MG/100ML; MG/100ML
INJECTION, SOLUTION INTRAVENOUS CONTINUOUS
Status: DISCONTINUED | OUTPATIENT
Start: 2024-11-19 | End: 2024-11-19 | Stop reason: HOSPADM

## 2024-11-19 RX ORDER — HYDROMORPHONE HCL IN WATER/PF 6 MG/30 ML
0.2 PATIENT CONTROLLED ANALGESIA SYRINGE INTRAVENOUS EVERY 5 MIN PRN
Status: DISCONTINUED | OUTPATIENT
Start: 2024-11-19 | End: 2024-11-19 | Stop reason: HOSPADM

## 2024-11-19 RX ORDER — DEXAMETHASONE SODIUM PHOSPHATE 4 MG/ML
INJECTION, SOLUTION INTRA-ARTICULAR; INTRALESIONAL; INTRAMUSCULAR; INTRAVENOUS; SOFT TISSUE PRN
Status: DISCONTINUED | OUTPATIENT
Start: 2024-11-19 | End: 2024-11-19

## 2024-11-19 RX ORDER — LEVOTHYROXINE SODIUM 50 UG/1
50 TABLET ORAL DAILY
Status: DISCONTINUED | OUTPATIENT
Start: 2024-11-20 | End: 2024-11-20 | Stop reason: HOSPADM

## 2024-11-19 RX ORDER — ONDANSETRON 2 MG/ML
INJECTION INTRAMUSCULAR; INTRAVENOUS PRN
Status: DISCONTINUED | OUTPATIENT
Start: 2024-11-19 | End: 2024-11-19

## 2024-11-19 RX ORDER — NALOXONE HYDROCHLORIDE 0.4 MG/ML
0.2 INJECTION, SOLUTION INTRAMUSCULAR; INTRAVENOUS; SUBCUTANEOUS
Status: DISCONTINUED | OUTPATIENT
Start: 2024-11-19 | End: 2024-11-20 | Stop reason: HOSPADM

## 2024-11-19 RX ORDER — LIDOCAINE HYDROCHLORIDE 20 MG/ML
INJECTION, SOLUTION INFILTRATION; PERINEURAL PRN
Status: DISCONTINUED | OUTPATIENT
Start: 2024-11-19 | End: 2024-11-19

## 2024-11-19 RX ORDER — ATORVASTATIN CALCIUM 20 MG/1
20 TABLET, FILM COATED ORAL AT BEDTIME
Status: DISCONTINUED | OUTPATIENT
Start: 2024-11-19 | End: 2024-11-20 | Stop reason: HOSPADM

## 2024-11-19 RX ORDER — FENTANYL CITRATE 50 UG/ML
50 INJECTION, SOLUTION INTRAMUSCULAR; INTRAVENOUS EVERY 5 MIN PRN
Status: DISCONTINUED | OUTPATIENT
Start: 2024-11-19 | End: 2024-11-19 | Stop reason: HOSPADM

## 2024-11-19 RX ORDER — ONDANSETRON 2 MG/ML
4 INJECTION INTRAMUSCULAR; INTRAVENOUS EVERY 30 MIN PRN
Status: DISCONTINUED | OUTPATIENT
Start: 2024-11-19 | End: 2024-11-19 | Stop reason: HOSPADM

## 2024-11-19 RX ORDER — FINASTERIDE 5 MG/1
5 TABLET, FILM COATED ORAL DAILY
Status: DISCONTINUED | OUTPATIENT
Start: 2024-11-19 | End: 2024-11-20 | Stop reason: HOSPADM

## 2024-11-19 RX ORDER — ONDANSETRON 4 MG/1
4 TABLET, ORALLY DISINTEGRATING ORAL EVERY 6 HOURS PRN
Status: DISCONTINUED | OUTPATIENT
Start: 2024-11-19 | End: 2024-11-20 | Stop reason: HOSPADM

## 2024-11-19 RX ORDER — HYDROXYZINE HYDROCHLORIDE 10 MG/1
10 TABLET, FILM COATED ORAL EVERY 6 HOURS PRN
Qty: 30 TABLET | Refills: 0 | Status: SHIPPED | OUTPATIENT
Start: 2024-11-19

## 2024-11-19 RX ORDER — HYDROCHLOROTHIAZIDE 25 MG/1
25 TABLET ORAL DAILY
Status: DISCONTINUED | OUTPATIENT
Start: 2024-11-19 | End: 2024-11-20 | Stop reason: HOSPADM

## 2024-11-19 RX ORDER — ASPIRIN 81 MG/1
81 TABLET ORAL 2 TIMES DAILY
Status: DISCONTINUED | OUTPATIENT
Start: 2024-11-19 | End: 2024-11-20 | Stop reason: HOSPADM

## 2024-11-19 RX ORDER — HYDRALAZINE HYDROCHLORIDE 20 MG/ML
10 INJECTION INTRAMUSCULAR; INTRAVENOUS EVERY 4 HOURS PRN
Status: DISCONTINUED | OUTPATIENT
Start: 2024-11-19 | End: 2024-11-20 | Stop reason: HOSPADM

## 2024-11-19 RX ORDER — HYDROMORPHONE HYDROCHLORIDE 2 MG/1
2 TABLET ORAL EVERY 4 HOURS PRN
Status: DISCONTINUED | OUTPATIENT
Start: 2024-11-19 | End: 2024-11-20 | Stop reason: HOSPADM

## 2024-11-19 RX ORDER — BISACODYL 10 MG
10 SUPPOSITORY, RECTAL RECTAL DAILY PRN
Status: DISCONTINUED | OUTPATIENT
Start: 2024-11-21 | End: 2024-11-20 | Stop reason: HOSPADM

## 2024-11-19 RX ORDER — ACETAMINOPHEN 325 MG/1
650 TABLET ORAL EVERY 4 HOURS PRN
Qty: 100 TABLET | Refills: 0 | Status: SHIPPED | OUTPATIENT
Start: 2024-11-19

## 2024-11-19 RX ORDER — ONDANSETRON 2 MG/ML
4 INJECTION INTRAMUSCULAR; INTRAVENOUS EVERY 6 HOURS PRN
Status: DISCONTINUED | OUTPATIENT
Start: 2024-11-19 | End: 2024-11-20 | Stop reason: HOSPADM

## 2024-11-19 RX ORDER — CEFAZOLIN SODIUM/WATER 3 G/30 ML
3 SYRINGE (ML) INTRAVENOUS SEE ADMIN INSTRUCTIONS
Status: DISCONTINUED | OUTPATIENT
Start: 2024-11-19 | End: 2024-11-19 | Stop reason: HOSPADM

## 2024-11-19 RX ORDER — NALOXONE HYDROCHLORIDE 0.4 MG/ML
0.1 INJECTION, SOLUTION INTRAMUSCULAR; INTRAVENOUS; SUBCUTANEOUS
Status: DISCONTINUED | OUTPATIENT
Start: 2024-11-19 | End: 2024-11-19 | Stop reason: HOSPADM

## 2024-11-19 RX ORDER — NALOXONE HYDROCHLORIDE 0.4 MG/ML
0.4 INJECTION, SOLUTION INTRAMUSCULAR; INTRAVENOUS; SUBCUTANEOUS
Status: DISCONTINUED | OUTPATIENT
Start: 2024-11-19 | End: 2024-11-20 | Stop reason: HOSPADM

## 2024-11-19 RX ORDER — GLYCOPYRROLATE 0.2 MG/ML
INJECTION, SOLUTION INTRAMUSCULAR; INTRAVENOUS PRN
Status: DISCONTINUED | OUTPATIENT
Start: 2024-11-19 | End: 2024-11-19

## 2024-11-19 RX ORDER — MAGNESIUM HYDROXIDE 1200 MG/15ML
LIQUID ORAL PRN
Status: DISCONTINUED | OUTPATIENT
Start: 2024-11-19 | End: 2024-11-19 | Stop reason: HOSPADM

## 2024-11-19 RX ORDER — VANCOMYCIN HYDROCHLORIDE 1 G/20ML
INJECTION, POWDER, LYOPHILIZED, FOR SOLUTION INTRAVENOUS PRN
Status: DISCONTINUED | OUTPATIENT
Start: 2024-11-19 | End: 2024-11-19 | Stop reason: HOSPADM

## 2024-11-19 RX ORDER — FENTANYL CITRATE 50 UG/ML
25 INJECTION, SOLUTION INTRAMUSCULAR; INTRAVENOUS EVERY 5 MIN PRN
Status: DISCONTINUED | OUTPATIENT
Start: 2024-11-19 | End: 2024-11-19 | Stop reason: HOSPADM

## 2024-11-19 RX ORDER — CEFAZOLIN SODIUM 2 G/100ML
2 INJECTION, SOLUTION INTRAVENOUS EVERY 8 HOURS
Status: COMPLETED | OUTPATIENT
Start: 2024-11-19 | End: 2024-11-20

## 2024-11-19 RX ORDER — PROCHLORPERAZINE MALEATE 5 MG/1
5 TABLET ORAL EVERY 6 HOURS PRN
Status: DISCONTINUED | OUTPATIENT
Start: 2024-11-19 | End: 2024-11-20 | Stop reason: HOSPADM

## 2024-11-19 RX ORDER — AMOXICILLIN 250 MG
1-2 CAPSULE ORAL 2 TIMES DAILY
Qty: 30 TABLET | Refills: 0 | Status: SHIPPED | OUTPATIENT
Start: 2024-11-19

## 2024-11-19 RX ORDER — PROPOFOL 10 MG/ML
INJECTION, EMULSION INTRAVENOUS PRN
Status: DISCONTINUED | OUTPATIENT
Start: 2024-11-19 | End: 2024-11-19

## 2024-11-19 RX ORDER — HYDROMORPHONE HYDROCHLORIDE 2 MG/1
4 TABLET ORAL EVERY 4 HOURS PRN
Status: DISCONTINUED | OUTPATIENT
Start: 2024-11-19 | End: 2024-11-20 | Stop reason: HOSPADM

## 2024-11-19 RX ORDER — POLYETHYLENE GLYCOL 3350 17 G/17G
17 POWDER, FOR SOLUTION ORAL DAILY
Status: DISCONTINUED | OUTPATIENT
Start: 2024-11-19 | End: 2024-11-20 | Stop reason: HOSPADM

## 2024-11-19 RX ORDER — EPHEDRINE SULFATE 50 MG/ML
INJECTION, SOLUTION INTRAMUSCULAR; INTRAVENOUS; SUBCUTANEOUS PRN
Status: DISCONTINUED | OUTPATIENT
Start: 2024-11-19 | End: 2024-11-19

## 2024-11-19 RX ORDER — HYDROMORPHONE HCL IN WATER/PF 6 MG/30 ML
0.4 PATIENT CONTROLLED ANALGESIA SYRINGE INTRAVENOUS EVERY 5 MIN PRN
Status: DISCONTINUED | OUTPATIENT
Start: 2024-11-19 | End: 2024-11-19 | Stop reason: HOSPADM

## 2024-11-19 RX ORDER — LABETALOL HYDROCHLORIDE 5 MG/ML
10 INJECTION, SOLUTION INTRAVENOUS
Status: DISCONTINUED | OUTPATIENT
Start: 2024-11-19 | End: 2024-11-19 | Stop reason: HOSPADM

## 2024-11-19 RX ORDER — ONDANSETRON 4 MG/1
4 TABLET, ORALLY DISINTEGRATING ORAL EVERY 30 MIN PRN
Status: DISCONTINUED | OUTPATIENT
Start: 2024-11-19 | End: 2024-11-19 | Stop reason: HOSPADM

## 2024-11-19 RX ORDER — PRAMIPEXOLE DIHYDROCHLORIDE 0.25 MG/1
0.25 TABLET ORAL AT BEDTIME
Status: DISCONTINUED | OUTPATIENT
Start: 2024-11-19 | End: 2024-11-20 | Stop reason: HOSPADM

## 2024-11-19 RX ORDER — CEFAZOLIN SODIUM/WATER 3 G/30 ML
3 SYRINGE (ML) INTRAVENOUS
Status: COMPLETED | OUTPATIENT
Start: 2024-11-19 | End: 2024-11-19

## 2024-11-19 RX ORDER — HYDROMORPHONE HCL IN WATER/PF 6 MG/30 ML
0.4 PATIENT CONTROLLED ANALGESIA SYRINGE INTRAVENOUS
Status: DISCONTINUED | OUTPATIENT
Start: 2024-11-19 | End: 2024-11-20 | Stop reason: HOSPADM

## 2024-11-19 RX ORDER — HYDROMORPHONE HCL IN WATER/PF 6 MG/30 ML
0.2 PATIENT CONTROLLED ANALGESIA SYRINGE INTRAVENOUS
Status: DISCONTINUED | OUTPATIENT
Start: 2024-11-19 | End: 2024-11-20 | Stop reason: HOSPADM

## 2024-11-19 RX ORDER — PROPOFOL 10 MG/ML
INJECTION, EMULSION INTRAVENOUS CONTINUOUS PRN
Status: DISCONTINUED | OUTPATIENT
Start: 2024-11-19 | End: 2024-11-19

## 2024-11-19 RX ORDER — HYDROXYZINE HYDROCHLORIDE 10 MG/1
10 TABLET, FILM COATED ORAL EVERY 6 HOURS PRN
Status: DISCONTINUED | OUTPATIENT
Start: 2024-11-19 | End: 2024-11-20 | Stop reason: HOSPADM

## 2024-11-19 RX ADMIN — GLYCOPYRROLATE 0.2 MG: 0.2 INJECTION, SOLUTION INTRAMUSCULAR; INTRAVENOUS at 12:09

## 2024-11-19 RX ADMIN — FINASTERIDE 5 MG: 5 TABLET, FILM COATED ORAL at 15:51

## 2024-11-19 RX ADMIN — PROPOFOL 100 MCG/KG/MIN: 10 INJECTION, EMULSION INTRAVENOUS at 11:19

## 2024-11-19 RX ADMIN — SODIUM CHLORIDE, POTASSIUM CHLORIDE, SODIUM LACTATE AND CALCIUM CHLORIDE: 600; 310; 30; 20 INJECTION, SOLUTION INTRAVENOUS at 15:51

## 2024-11-19 RX ADMIN — PHENYLEPHRINE HYDROCHLORIDE 0.5 MCG/KG/MIN: 10 INJECTION INTRAVENOUS at 11:27

## 2024-11-19 RX ADMIN — ATORVASTATIN CALCIUM 20 MG: 20 TABLET, FILM COATED ORAL at 21:24

## 2024-11-19 RX ADMIN — ACETAMINOPHEN 975 MG: 325 TABLET, FILM COATED ORAL at 15:51

## 2024-11-19 RX ADMIN — ACETAMINOPHEN 975 MG: 325 TABLET, FILM COATED ORAL at 23:19

## 2024-11-19 RX ADMIN — SODIUM CHLORIDE, POTASSIUM CHLORIDE, SODIUM LACTATE AND CALCIUM CHLORIDE: 600; 310; 30; 20 INJECTION, SOLUTION INTRAVENOUS at 11:04

## 2024-11-19 RX ADMIN — Medication 5 MG: at 11:47

## 2024-11-19 RX ADMIN — LIDOCAINE HYDROCHLORIDE 60 MG: 20 INJECTION, SOLUTION INFILTRATION; PERINEURAL at 11:21

## 2024-11-19 RX ADMIN — Medication 3 G: at 11:06

## 2024-11-19 RX ADMIN — PRAMIPEXOLE DIHYDROCHLORIDE 0.25 MG: 0.25 TABLET ORAL at 21:24

## 2024-11-19 RX ADMIN — PROPOFOL 20 MG: 10 INJECTION, EMULSION INTRAVENOUS at 11:25

## 2024-11-19 RX ADMIN — ASPIRIN 81 MG: 81 TABLET, COATED ORAL at 21:23

## 2024-11-19 RX ADMIN — LIDOCAINE HYDROCHLORIDE 40 MG: 20 INJECTION, SOLUTION INFILTRATION; PERINEURAL at 11:27

## 2024-11-19 RX ADMIN — CEFAZOLIN SODIUM 2 G: 2 INJECTION, SOLUTION INTRAVENOUS at 19:32

## 2024-11-19 RX ADMIN — BUPIVACAINE HYDROCHLORIDE IN DEXTROSE 1.8 ML: 7.5 INJECTION, SOLUTION SUBARACHNOID at 11:05

## 2024-11-19 RX ADMIN — SENNOSIDES AND DOCUSATE SODIUM 1 TABLET: 50; 8.6 TABLET ORAL at 21:24

## 2024-11-19 RX ADMIN — TRANEXAMIC ACID 1950 MG: 650 TABLET ORAL at 09:22

## 2024-11-19 RX ADMIN — ONDANSETRON 4 MG: 2 INJECTION INTRAMUSCULAR; INTRAVENOUS at 11:37

## 2024-11-19 RX ADMIN — DEXAMETHASONE SODIUM PHOSPHATE 10 MG: 4 INJECTION, SOLUTION INTRA-ARTICULAR; INTRALESIONAL; INTRAMUSCULAR; INTRAVENOUS; SOFT TISSUE at 11:21

## 2024-11-19 RX ADMIN — PROPOFOL 20 MG: 10 INJECTION, EMULSION INTRAVENOUS at 11:33

## 2024-11-19 ASSESSMENT — ACTIVITIES OF DAILY LIVING (ADL)
ADLS_ACUITY_SCORE: 0

## 2024-11-19 NOTE — BRIEF OP NOTE
[facility]    Brief Operative Note    Pre-operative diagnosis: Osteoarthritis [M19.90]  Post-operative diagnosis Same as pre-operative diagnosis    Procedure: LEFT TOTAL KNEE ARTHROPLASTY, Left - Knee    Surgeon: Surgeons and Role:     * Chris Gamboa MD - Primary     * Margarita Cedillo PA-C - Assisting  Anesthesia: General   Estimated Blood Loss: Less than 50 ml    Drains: None  Specimens: * No specimens in log *  Findings:   None.  Complications: None.  Implants:   Implant Name Type Inv. Item Serial No.  Lot No. LRB No. Used Action   BONE CEMENT RADIOPAQUE SIMPLEX HV FULL DOSE 6194-1-001 - [id number] Cement, Bone BONE CEMENT RADIOPAQUE SIMPLEX HV FULL DOSE 6194-1-001  [employer] [id number] Left 2 Implanted   IMP COMP PATELLA SNN GEN II RESURFACING 38MM [id number] - [id number] Total Joint Component/Insert IMP COMP PATELLA SNN GEN II RESURFACING 38MM [id number]  [employer] [id number] Left 1 Implanted   IMP COMP FEMORAL S&N LEGION PS NP SZ7 LT [id number] - [id number] Total Joint Component/Insert IMP COMP FEMORAL S&N LEGION PS NP SZ7 LT [id number]  [employer] [id number] Left 1 Implanted   IMP BASEPLATE TIBIAL RICHIE II SZ 7 LT TI [id number] - [id number] Total Joint Component/Insert IMP BASEPLATE TIBIAL RICHIE II SZ 7 LT TI [id number]  [employer] [id number] Left 1 Implanted   IMP INSERT TIB S&N LGN PS HI FLEX XLPE SZ7-8 11MM [id number] - [id number] Total Joint Component/Insert IMP INSERT TIB S&N LGN PS HI FLEX XLPE SZ7-8 11MM [id number]  [employer] [id number] Left 1 Implanted

## 2024-11-19 NOTE — OR NURSING
Pt. Evaluated by Dr. Gamboa for surgery, Dr. Page MDA done with pre-op eval. And preparing for block. Pt.s wife Munira at bedside.

## 2024-11-19 NOTE — OR NURSING
OK by CHANNING Page to transfer to the floor. One belonging bag and cpap returned to pt. Spouse Munira called with the room number.

## 2024-11-19 NOTE — CONSULTS
[facility]  Consult Note - Hospitalist Service  Date of Admission:  11/19/2024  Date of Consultation: November 19, 2024  Consult Requested by: Orthopedics\  Reason for Consult: Medication reconciliation and management of medical comorbidities    Assessment & Plan   Sylvain Moss is a 78 year old male with PMH significant for hypertension, hyperlipidemia, anemia, hypothyroidism, bipolar affective disorder, BPH, among others who was admitted to [facility] on 11/19/2024 by the orthopedics service for elective left TKA.     Left total knee arthroplasty 11/19/2024   Surgery with Dr. Chris Gamboa. GETA. EBL less than 50 mL. No immediate postoperative complications however noted some transient soft blood pressures with systolic in the 90s.  - Routine postoperative cares per primary service, including IVF, pain control, abx, DVT ppx, and disposition  - PT/OT as per primary service  - Aggressive pulmonary toilet, frequent IS use 10x/hour while awake  - Check Hgb postoperative day #1, Baseline Hgb 12.6 in the setting of chronic normocytic anemia  - Discharge medication not yet reconciliation completed pending labs and recommendations on 11/20/24    Transient blood pressures perioperatively  She recovered nicely.  Patient asymptomatic, denies lightheadedness or dizziness.  -Add on BNP to a.m. labs and recheck POD #1  -History of hyponatremia and currently on thiazide, monitor serum sodium, do not see preop BMP and H&P    Normocytic anemia  Minimal blood loss less than 50 mL.  Preop hemoglobin 12.6, patient reports he has been taking iron PTA and they have been monitoring hemoglobin levels.  -Recheck hemoglobin postoperative day 1  -Hold PTA meloxicam    Benign essential hypertension  History of hyponatremia  PTA regimen: Hydrochlorothiazide 25 mg daily, lisinopril 40 mg daily  Transient soft blood pressures perioperatively.  History of hyponatremia noted sodium 120 7/2024  "and 135 in July in Care Everywhere.  - Hold PTA ACE inhibitor and thiazide pending POD#1 BMP and blood pressure*  - PRN IV hydralazine available for SBP >180    History of BPH with retention  -Resume PTA finasteride 5 mg daily    Hypothyroidism: Continue PTA levothyroxine 50 mcg daily    Bipolar affective disorder: Not presently on medications PTA, stable    Hyperlipidemia: Continue PTA atorvastatin 20 mg nightly    History of sinus bradycardia  Noted on preop EKG, heart rate 55, first-degree AV block.  - Monitor as needed and if ongoing bradycardia could consider adding cardiac monitor/EKG however no acute need at this juncture.    Glaucoma: Continue PTA drops    Restless leg syndrome: Continue PTA Mirapex    Obese class II  Body mass index is 37.45 kg/m .  Increases all cause mortality.  -Follow-up with primary care provider after recovery      Clinically Significant Risk Factors Present on Admission                 # Drug Induced Platelet Defect: home medication list includes an antiplatelet medication   # Hypertension: Home medication list includes antihypertensive(s)           # Obesity: Estimated body mass index is 37.45 kg/m  as calculated from the following:    Height as of this encounter: 1.88 m (6' 2\").    Weight as of this encounter: 132.3 kg (291 lb 11.2 oz).              The patient's care was discussed with the Attending Physician, Dr. Mcknight, Bedside Nurse, and Patient.    JOSUÉ Victoria, PAThaniaC  Hospitalist AISHWARYA  [facility]  Securely message with the Vocera Web Console (learn more here)  Text page via [employer] Paging/Directory  ______________________________________________________________________    Chief Complaint   Left knee pain    History is obtained from the patient and electronic health record    History of Present Illness   Sylvain Moss is a 78 year old male with PMH significant for hypertension, hyperlipidemia, anemia, hypothyroidism, bipolar affective " disorder, BPH, among others who was admitted to [facility] on 11/19/2024 by the orthopedics service for elective left TKA. The Hospitalist service was consulted for medical co-management. Preoperative H&P reviewed.  CBC and EKG result noted.  No BMP PTA.  Noted on lisinopril and hydrochlorothiazide.  Previously hyponatremic.  During my visit, well-appearing, hemodynamically stable, no complaints.      Past Medical History    Past Medical History:   Diagnosis Date    Adenomatous polyp of colon     Bipolar affective disorder (H)     Elevated serum cholesterol     Hypertension     Hypothyroidism     Lipoma of skin and subcutaneous tissue     Mixed hyperlipidemia     Multiple lipomas     Nocturia     Obese     Osteoarthritis of left knee        Past Surgical History   Past Surgical History:   Procedure Laterality Date    ABLATION, HIFU, PROSTATE  2024    ANKLE SURGERY Right     ARTHROPLASTY KNEE Right 04/16/2024    Procedure: Right Total Knee Arthroplasty;  Surgeon: Chris Gamboa MD;  Location: [facility] OR    ARTHROSCOPY KNEE BILATERAL Bilateral 2008       Medications   I have reviewed this patient's current medications  Medications Prior to Admission   Medication Sig Dispense Refill Last Dose/Taking    acetaminophen (TYLENOL) 500 MG tablet Take 1,000 mg by mouth every 6 hours as needed for mild pain ([id number]).   Past Week    aspirin 81 MG EC tablet Take 1 tablet (81 mg) by mouth 2 times daily 60 tablet 0 11/11/2024 Morning    azithromycin (ZITHROMAX) 250 MG tablet 500 mg ([id number]) on day 1  then 250 mg for 4 days   Morning    clindamycin (CLEOCIN) 150 MG capsule Take by mouth. 300mg 1 hours before dental appointment then 150 mg 6 hours after dental appointment.   10/28/2024    diclofenac (VOLTAREN) 1 % topical gel Apply topically 4 times daily.   11/11/2024 Morning    finasteride (PROSCAR) 5 MG tablet Take 1 tablet (5 mg) by mouth daily 90 tablet 3 11/11/2024 Morning    fluticasone  (FLONASE) 50 MCG/ACT nasal spray Spray 2 sprays into both nostrils at bedtime   11/18/2024    hydrochlorothiazide (HYDRODIURIL) 25 MG tablet Take 25 mg by mouth daily   11/18/2024    latanoprost (XALATAN) 0.005 % ophthalmic solution Place 1 drop into both eyes at bedtime   11/18/2024    Levothyroxine Sodium (SYNTHROID PO) Take 50 mcg by mouth daily   11/19/2024 Morning    lisinopril (ZESTRIL) 40 MG tablet Take 40 mg by mouth daily   11/19/2024 Morning    meloxicam (MOBIC) 7.5 MG tablet Take 7.5 mg by mouth daily.   11/11/2024 Evening    Multiple Vitamins-Minerals (PRESERVISION AREDS 2 PO) Take 2 tablets by mouth daily   11/11/2024 Morning    multivitamin w/minerals (THERA-VIT-M) tablet Take 1 tablet by mouth daily   11/11/2024 Morning    Omega-3 Fatty Acids (FISH OIL) 1200 MG capsule Take 1,200 mg by mouth 2 times daily.   11/11/2024 Morning    pramipexole (MIRAPEX) 0.25 MG tablet Take 0.25 mg by mouth at bedtime   11/18/2024    ATORVASTATIN CALCIUM PO Take 20 mg by mouth at bedtime             Review of Systems    The 10 point Review of Systems is negative other than noted in the HPI or here.     Social History   I have reviewed this patient's social history and updated it with pertinent information if needed.  Social History     Tobacco Use    Smoking status: Never    Smokeless tobacco: Never   Substance Use Topics    Alcohol use: Yes     Comment: moderate    Drug use: No         Allergies   Allergies   Allergen Reactions    Penicillins Rash        Physical Exam   Vital Signs: Temp: 97.7  F (36.5  C) Temp src: Oral BP: 139/72 Pulse: 60   Resp: 21 SpO2: 96 % O2 Device: Nasal cannula Oxygen Delivery: 2 LPM  Weight: 291 lbs 11.2 oz      General: Awake, alert, very pleasant gentleman who appears stated age. Looks comfortable sitting up in bed. No acute distress.  HEENT: Normocephalic, atraumatic. Extraocular movements intact.   Respiratory: Clear to auscultation bilaterally, no rales, wheezing, or  rhonchi.  Cardiovascular: Regular rate and rhythm, +S1 and S2, no murmur auscultated. No peripheral edema.   Gastrointestinal: Soft, non-tender, non-distended. Bowel sounds present.  Skin: Warm, dry. No obvious rashes or lesions on exposed skin. Dorsalis pedis pulses palpable bilaterally.  Musculoskeletal: Left lower extremity Ace wrapped with knee brace in place.  Neurologic: AAO x3.   Psychiatric: Appropriate mood and affect. No obvious anxiety or depression.    Medical Decision Making       >35 MINUTES SPENT BY ME on the date of service doing chart review, history, exam, documentation & further activities per the note.      Data     I have personally reviewed the following data over the past 24 hrs:    N/A  \   N/A   / N/A     N/A N/A N/A /  N/A   4.2 N/A 1.00 \       Imaging results reviewed over the past 24 hrs:   Recent Results (from the past 24 hours)   XR Knee Port Left 1/2 Views    Narrative    EXAM: XR KNEE PORT LEFT 1/2 VIEWS  DATE/TIME: 11/19/2024 2:06 PM    INDICATION: Post-Op Total Knee  COMPARISON: No direct comparisons available.      Impression    IMPRESSION: Left total knee arthroplasty. Negative for postoperative  purposes.       MCKAYLA QIU DO         SYSTEM ID:  [id number]

## 2024-11-19 NOTE — OP NOTE
Procedure Date: November 19, 2024    PATIENT: Sylvain Moss  1945     PREOPERATIVE DIAGNOSIS:  Left knee advanced osteoarthritis.     POSTOPERATIVE DIAGNOSIS:  Left knee advanced osteoarthritis.     PROCEDURE:  Left total knee arthroplasty.     SURGEON:  Chris Gamboa MD     ASSISTANT:  Margarita Cedillo PA-C     COMPLICATIONS:  None.     ESTIMATED BLOOD LOSS:  30 mL.     IMPLANTS:  [employer] system:  1.  Size 7, PS, left femoral component, cobalt chrome  2.  Size 7, left standard tibial baseplate.  3.  Size 11, 7/8, high flex PS XLPE polyethylene.  4.  Size 38 concentric polyethylene patella.     DESCRIPTION OF PROCEDURE:  The patient was brought to the operating room November 19, 2024 for left total knee arthroplasty.  Patient has a history of advanced osteoarthritis of the knee refractory to conservative measures. Patient was seen on the day of surgery in the preoperative holding area.  Left knee marked as the correct operative site.  Received a dose of IV antibiotic less than 30 minutes prior to surgical incision.  Post-surgical antibiotic and DVT prophylaxis are indicated and will be prescribed.  Skilled assistant was used for positioning, draping, retraction, closure, dressing application. Regional block administered by anesthesia.     The patient was brought to the operating room and placed under a spinal anesthesia.  The operative lower extremity was prepped and draped in the standard sterile fashion.  Preoperative timeout was taken.  Thigh tourniquet inflated to 300 mmHg.  Anterior longitudinal surgical incision was made.  Medial parapatellar arthrotomy was performed.  Advanced tricompartmental osteoarthritis confirmed.  Marginal osteophytes debrided.  Synovectomy performed.  Patella was cut to a thickness of 14 mm and sized to a 38.  Femur prepared with an intramedullary guide jimmie and a 4-degree valgus cutting guide.  I took an extra 7 mm of distal femur considering significant  preoperative flexion contracture, and sized the cut distal femur to a 7.  Prepared in the appropriate rotational alignment.    The tibia was prepared using an extramedullary cutting guide, taking 9 mm of bone and cartilage off the lateral side.  The cut proximal tibia was sized to a 7 and was punched in the appropriate rotational alignment centered on the medial third of the tibial tubercle.  Posterior osteophytes and menisci were removed.  Posterior capsule injected with a cocktail of Toradol, ropivacaine and saline.    With trial components in place and a size 11 trial PS polyethylene, I was pleased with range of motion, stability, and gap balancing.  Trial components were removed.  Jet lavage irrigation performed.  Components listed above were cemented into place using 2 batch of high viscosity Simplex cement without antibiotic.  Once the cement had hardened and all extruded cement removed, I implanted the size 11 PS polyethylene for the 7 baseplate.    Tourniquet deflated.  Hemostasis achieved.  Betadine wash performed.  Jet lavage irrigation performed.  Arthrotomy was closed with interrupted Ethibond sutures over 1 g of vancomycin powder.  Superficial soft tissues irrigated and closed in layers.  A sterile dressing was applied.  The patient was brought to recovery in stable condition.  Needle and lap counts were correct at the end of the case.  There were no known complications.    Additional intraoperative findings of note: None    Special postsurgical patient care factors: None     Chris Gamboa MD

## 2024-11-19 NOTE — ANESTHESIA PREPROCEDURE EVALUATION
Anesthesia Pre-Procedure Evaluation    Patient: Sylvain Moss   MRN: [id number] : 1945        Procedure : Procedure(s):  LEFT TOTAL KNEE ARTHROPLASTY          Past Medical History:   Diagnosis Date    Adenomatous polyp of colon     Bipolar affective disorder (H)     Elevated serum cholesterol     Hypertension     Hypothyroidism     Lipoma of skin and subcutaneous tissue     Mixed hyperlipidemia     Multiple lipomas     Nocturia     Obese     Osteoarthritis of left knee       Past Surgical History:   Procedure Laterality Date    ABLATION, HIFU, PROSTATE      ANKLE SURGERY Right     ARTHROPLASTY KNEE Right 2024    Procedure: Right Total Knee Arthroplasty;  Surgeon: Chris Gamboa MD;  Location: [facility]    ARTHROSCOPY KNEE BILATERAL Bilateral       Allergies   Allergen Reactions    Penicillins Rash      Social History     Tobacco Use    Smoking status: Never    Smokeless tobacco: Never   Substance Use Topics    Alcohol use: Yes     Comment: moderate      Wt Readings from Last 1 Encounters:   24 132.3 kg (291 lb 11.2 oz)        Anesthesia Evaluation            ROS/MED HX  ENT/Pulmonary:     (+) sleep apnea, uses CPAP,                                      Neurologic:       Cardiovascular:     (+) Dyslipidemia hypertension- -   -  - -                                      METS/Exercise Tolerance:     Hematologic:       Musculoskeletal:       GI/Hepatic:    (-) GERD   Renal/Genitourinary:       Endo:     (+)          thyroid problem, hypothyroidism,    Obesity,       Psychiatric/Substance Use:     (+) psychiatric history bipolar       Infectious Disease:       Malignancy:   (+) Malignancy, History of Prostate.    Other:            Physical Exam    Airway        Mallampati: I   TM distance: > 3 FB   Neck ROM: full   Mouth opening: > 3 cm    Respiratory Devices and Support         Dental       (+) Multiple crowns, permanant bridges      Cardiovascular   cardiovascular exam normal       "    Pulmonary   pulmonary exam normal                OUTSIDE LABS:  CBC:   Lab Results   Component Value Date    HGB 10.8 (L) 04/17/2024     BMP:   Lab Results   Component Value Date     (L) 04/17/2024    POTASSIUM 4.0 04/17/2024    POTASSIUM 4.1 04/16/2024    CHLORIDE 93 (L) 04/17/2024    CO2 22 04/17/2024    BUN 21.8 04/17/2024    CR 1.00 04/17/2024    CR 1.07 04/16/2024     (H) 04/17/2024     (H) 04/17/2024     COAGS: No results found for: \"PTT\", \"INR\", \"FIBR\"  POC: No results found for: \"BGM\", \"HCG\", \"HCGS\"  HEPATIC: No results found for: \"ALBUMIN\", \"PROTTOTAL\", \"ALT\", \"AST\", \"GGT\", \"ALKPHOS\", \"BILITOTAL\", \"BILIDIRECT\", \"DANA\"  OTHER:   Lab Results   Component Value Date    DEIDRE 8.4 (L) 04/17/2024       Anesthesia Plan    ASA Status:  3    NPO Status:  NPO Appropriate    Anesthesia Type: Spinal.              Consents    Anesthesia Plan(s) and associated risks, benefits, and realistic alternatives discussed. Questions answered and patient/representative(s) expressed understanding.     - Discussed:     - Discussed with:  Patient            Postoperative Care    Pain management: IV analgesics, Peripheral nerve block (Single Shot).   PONV prophylaxis: Ondansetron (or other 5HT-3)     Comments:               LUZ MANJARREZ MD    I have reviewed the pertinent notes and labs in the chart from the past 30 days and (re)examined the patient.  Any updates or changes from those notes are reflected in this note.             # Drug Induced Platelet Defect: home medication list includes an antiplatelet medication   # Hypertension: Home medication list includes antihypertensive(s)           # Obesity: Estimated body mass index is 37.45 kg/m  as calculated from the following:    Height as of this encounter: 1.88 m (6' 2\").    Weight as of this encounter: 132.3 kg (291 lb 11.2 oz).             "

## 2024-11-19 NOTE — ANESTHESIA POSTPROCEDURE EVALUATION
Patient: Sylvain Moss    Procedure: Procedure(s):  LEFT TOTAL KNEE ARTHROPLASTY       Anesthesia Type:  Spinal    Note:  Disposition: Inpatient   Postop Pain Control: Uneventful            Sign Out: Well controlled pain   PONV: No   Neuro/Psych: Uneventful            Sign Out: Acceptable/Baseline neuro status   Airway/Respiratory: Uneventful            Sign Out: Acceptable/Baseline resp. status   CV/Hemodynamics: Uneventful            Sign Out: Acceptable CV status; No obvious hypovolemia; No obvious fluid overload   Other NRE:    DID A NON-ROUTINE EVENT OCCUR?        Last vitals:  Vitals Value Taken Time   /62 11/19/24 1415   Temp     Pulse 65 11/19/24 1427   Resp 18 11/19/24 1425   SpO2 97 % 11/19/24 1426   Vitals shown include unfiled device data.    Electronically Signed By: Felicia Alba MD  November 19, 2024  2:45 PM

## 2024-11-19 NOTE — ANESTHESIA PROCEDURE NOTES
Adductor canal Procedure Note    Pre-Procedure   Staff -        Anesthesiologist:  Humberto Page MD       Performed By: Anesthesiologist       Location: pre-op       Pre-Anesthestic Checklist: patient identified, IV checked, site marked, risks and benefits discussed, informed consent, monitors and equipment checked, at physician/surgeon's request and post-op pain management  Timeout:       Correct Patient: Yes        Correct Procedure: Yes        Correct Site: Yes        Correct Position: Yes        Correct Laterality: Yes        Site Marked: Yes  Procedure Documentation  Procedure: Adductor canal       Laterality: left       Patient Position: supine       Patient Prep/Sterile Barriers: sterile gloves, mask       Skin prep: Chloraprep       Local skin infiltrated with mL of 1% lidocaine.        Needle Type: insulated and short bevel (Arrow)       Needle Gauge: 21.        Needle Length (millimeters): 90        Ultrasound guided       1. Ultrasound was used to identify targeted nerve, plexus, vascular marker, or fascial plane and place a needle adjacent to it in real-time.       2. Ultrasound was used to visualize the spread of anesthetic in close proximity to the above referenced structure.       3. A permanent image is entered into the patient's record.       4. The visualized anatomic structures appeared normal.       5. There were no apparent abnormal pathologic findings.    Assessment/Narrative         The placement was negative for: blood aspirated, painful injection and site bleeding       Paresthesias: No.       Bolus given via needle..        Secured via.        Insertion/Infusion Method: Single Shot       Complications: none    Medication(s) Administered   Ropivacaine 0.5% w/ 1:400K Epi (Injection) - Injection   15 mL - 11/19/2024 10:03:00 AM   Comments:  Peripheral nerve block performed at request of the primary medical team.      Postoperative pain block requested by surgeon for severe postoperative  "pain.  Patient brought to Preop for procedure.      Pt tolerated well.    No complications.      The surgeon has given a verbal order transferring care of this patient to me for the performance of a regional analgesia block for post-op pain control. It is requested of me because I am uniquely trained and qualified to perform this block and the surgeon is neither trained nor qualified to perform this procedure.    LUZ MANJARREZ MD   November 19, 2024 10:04 AM         FOR [facility] (Psychiatric/[facility]) ONLY:   Pain Team Contact information: please page the Pain Team Via [employer]. Search \"Pain\". During daytime hours, please page the attending first. At night please page the resident first.      "

## 2024-11-20 ENCOUNTER — APPOINTMENT (OUTPATIENT)
Dept: PHYSICAL THERAPY | Facility: CLINIC | Age: 79
End: 2024-11-20
Attending: ORTHOPAEDIC SURGERY
Payer: COMMERCIAL

## 2024-11-20 VITALS
HEART RATE: 77 BPM | DIASTOLIC BLOOD PRESSURE: 67 MMHG | RESPIRATION RATE: 16 BRPM | OXYGEN SATURATION: 97 % | SYSTOLIC BLOOD PRESSURE: 128 MMHG | HEIGHT: 74 IN | WEIGHT: 291.7 LBS | BODY MASS INDEX: 37.43 KG/M2 | TEMPERATURE: 98 F

## 2024-11-20 LAB
ANION GAP SERPL CALCULATED.3IONS-SCNC: 9 MMOL/L (ref 7–15)
BUN SERPL-MCNC: 20.6 MG/DL (ref 8–23)
CALCIUM SERPL-MCNC: 8.8 MG/DL (ref 8.8–10.4)
CHLORIDE SERPL-SCNC: 96 MMOL/L (ref 98–107)
CREAT SERPL-MCNC: 1.05 MG/DL (ref 0.67–1.17)
EGFRCR SERPLBLD CKD-EPI 2021: 72 ML/MIN/1.73M2
FASTING STATUS PATIENT QL REPORTED: YES
GLUCOSE SERPL-MCNC: 117 MG/DL (ref 70–99)
GLUCOSE SERPL-MCNC: 119 MG/DL (ref 70–99)
HCO3 SERPL-SCNC: 25 MMOL/L (ref 22–29)
HGB BLD-MCNC: 11 G/DL (ref 13.3–17.7)
POTASSIUM SERPL-SCNC: 4.3 MMOL/L (ref 3.4–5.3)
SODIUM SERPL-SCNC: 130 MMOL/L (ref 135–145)

## 2024-11-20 PROCEDURE — 97110 THERAPEUTIC EXERCISES: CPT | Mod: GP,CQ | Performed by: PHYSICAL THERAPY ASSISTANT

## 2024-11-20 PROCEDURE — 85018 HEMOGLOBIN: CPT | Performed by: ORTHOPAEDIC SURGERY

## 2024-11-20 PROCEDURE — 80051 ELECTROLYTE PANEL: CPT | Performed by: PHYSICIAN ASSISTANT

## 2024-11-20 PROCEDURE — 80048 BASIC METABOLIC PNL TOTAL CA: CPT | Performed by: PHYSICIAN ASSISTANT

## 2024-11-20 PROCEDURE — 97116 GAIT TRAINING THERAPY: CPT | Mod: GP,CQ | Performed by: PHYSICAL THERAPY ASSISTANT

## 2024-11-20 PROCEDURE — 82947 ASSAY GLUCOSE BLOOD QUANT: CPT | Performed by: ORTHOPAEDIC SURGERY

## 2024-11-20 PROCEDURE — 999N000111 HC STATISTIC OT IP EVAL DEFER

## 2024-11-20 PROCEDURE — 36415 COLL VENOUS BLD VENIPUNCTURE: CPT | Performed by: PHYSICIAN ASSISTANT

## 2024-11-20 PROCEDURE — 97530 THERAPEUTIC ACTIVITIES: CPT | Mod: GP,CQ | Performed by: PHYSICAL THERAPY ASSISTANT

## 2024-11-20 PROCEDURE — 82435 ASSAY OF BLOOD CHLORIDE: CPT | Performed by: PHYSICIAN ASSISTANT

## 2024-11-20 PROCEDURE — 99232 SBSQ HOSP IP/OBS MODERATE 35: CPT | Performed by: NURSE PRACTITIONER

## 2024-11-20 PROCEDURE — 82947 ASSAY GLUCOSE BLOOD QUANT: CPT | Performed by: PHYSICIAN ASSISTANT

## 2024-11-20 PROCEDURE — 82374 ASSAY BLOOD CARBON DIOXIDE: CPT | Performed by: PHYSICIAN ASSISTANT

## 2024-11-20 PROCEDURE — 82310 ASSAY OF CALCIUM: CPT | Performed by: PHYSICIAN ASSISTANT

## 2024-11-20 PROCEDURE — 82565 ASSAY OF CREATININE: CPT | Performed by: PHYSICIAN ASSISTANT

## 2024-11-20 PROCEDURE — 250N000013 HC RX MED GY IP 250 OP 250 PS 637: Performed by: ORTHOPAEDIC SURGERY

## 2024-11-20 PROCEDURE — 250N000011 HC RX IP 250 OP 636: Mod: JZ | Performed by: ORTHOPAEDIC SURGERY

## 2024-11-20 RX ORDER — BUPIVACAINE HYDROCHLORIDE 7.5 MG/ML
INJECTION, SOLUTION INTRASPINAL
Status: DISCONTINUED | OUTPATIENT
Start: 2024-11-19 | End: 2024-11-20

## 2024-11-20 RX ADMIN — ASPIRIN 81 MG: 81 TABLET, COATED ORAL at 09:28

## 2024-11-20 RX ADMIN — POLYETHYLENE GLYCOL 3350 17 G: 17 POWDER, FOR SOLUTION ORAL at 09:28

## 2024-11-20 RX ADMIN — CEFAZOLIN SODIUM 2 G: 2 INJECTION, SOLUTION INTRAVENOUS at 03:38

## 2024-11-20 RX ADMIN — LEVOTHYROXINE SODIUM 50 MCG: 50 TABLET ORAL at 09:28

## 2024-11-20 RX ADMIN — SENNOSIDES AND DOCUSATE SODIUM 1 TABLET: 50; 8.6 TABLET ORAL at 09:28

## 2024-11-20 RX ADMIN — FINASTERIDE 5 MG: 5 TABLET, FILM COATED ORAL at 09:28

## 2024-11-20 RX ADMIN — ACETAMINOPHEN 975 MG: 325 TABLET, FILM COATED ORAL at 09:27

## 2024-11-20 ASSESSMENT — ACTIVITIES OF DAILY LIVING (ADL)
ADLS_ACUITY_SCORE: 0

## 2024-11-20 NOTE — PROGRESS NOTES
Orthopedic Surgery  Sylvain Moss  11/20/2024     Admit Date:  11/19/2024    POD: 1 Day Post-Op   Procedure(s):  LEFT TOTAL KNEE ARTHROPLASTY    Patient resting comfortably in bed.    Pain controlled.  Tolerating oral intake.    Voiding adequately  Ambulating with therapy  Denies nausea or vomiting.  Denies chest pain or shortness of breath.  No acute events overnight.    Temp:  [97  F (36.1  C)-99.2  F (37.3  C)] 99.2  F (37.3  C)  Pulse:  [60-94] 81  Resp:  [11-23] 16  BP: ()/(45-84) 136/74  SpO2:  [93 %-99 %] 95 %    Alert and oriented.   Left dressing is clean, dry, and intact.   Bilateral calves are soft, non-tender.  Left lower extremity is NVI.  Patient able to resist ankle dorsiflexion and plantar flexion bilaterally.  DP pulse palpable.  Sensation intact bilateral lower extremities.      A/P    DVT prophylaxis: Aspirin  Activity:  WBAT   Pain: Dilaudid/tylenol  Dressings: daily island dressing. Change prior to discharge.    Anticipate discharge to home later this morning with OP PT    Appreciate hospitalist co-management    Follow-up: 2 weeks post-op with Margarita Cedillo PA-C/Chris Cedillo PA-C  [facility]

## 2024-11-20 NOTE — PROGRESS NOTES
11/19/24 1800   Appointment Info   Signing Clinician's Name / Credentials (PT) Wade Kim DPT   Rehab Comments (PT) WBAT LLE, ROM as julissa. Pt just had other knee replaced in April of 2024, did well at discharge at home with mobility and ADL's, spouse around to help and adult son lives nearby. Pt did well in PT tonight so recommend deferring OT   Quick Adds   Quick Adds Certification   Living Environment   People in Home spouse   Current Living Arrangements house   Home Accessibility stairs to enter home;stairs within home   Number of Stairs, Main Entrance 2   Stair Railings, Main Entrance railings on both sides of stairs   Number of Stairs, Within Home, Primary eight   Stair Railings, Within Home, Primary railings safe and in good condition   Transportation Anticipated family or friend will provide   Living Environment Comments Pt lives in a house with his spouse, reports 2 HANK the home w/ B railing support. Pt has a walk in shower and a shower chair at home already. Son lives close by and avail to assist as well   Self-Care   Usual Activity Tolerance good   Current Activity Tolerance moderate   Equipment Currently Used at Home none   Fall history within last six months no   Activity/Exercise/Self-Care Comment at baseline pt IND with mobility and ADL's, has FWW from last TKA   General Information   Onset of Illness/Injury or Date of Surgery 11/19/24   Referring Physician Chris Gamboa MD   Patient/Family Therapy Goals Statement (PT) go home   Pertinent History of Current Problem (include personal factors and/or comorbidities that impact the POC) 78 y.o. male with hx of R TKA on 4/16/2024, s/p L TKA on 11/19/2024, POD#0   Existing Precautions/Restrictions fall   Weight-Bearing Status - LLE weight-bearing as tolerated   Weight-Bearing Status - RLE full weight-bearing   Cognition   Affect/Mental Status (Cognition) WNL   Orientation Status (Cognition) oriented x 4   Follows Commands (Cognition) WNL   Pain  Assessment   Patient Currently in Pain Yes, see Vital Sign flowsheet  (2/10 L knee)   Integumentary/Edema   Integumentary/Edema Comments dressing and ace wrap around L knee appears CDI   Posture    Posture Forward head position   Posture Comments flexed forward crouched posture   Range of Motion (ROM)   Range of Motion ROM deficits secondary to surgical procedure;ROM deficits secondary to pain;ROM deficits secondary to swelling;ROM deficits secondary to weakness   ROM Comment L knee AROM ~5-80   Strength (Manual Muscle Testing)   Strength (Manual Muscle Testing) Able to perform R SLR;Able to perform L SLR;Deficits observed during functional mobility   Strength Comments Deficits with L knee but able to demo good quad contraction and perform SLR   Bed Mobility   Comment, (Bed Mobility) SBA   Transfers   Comment, (Transfers) CGA with FWW   Gait/Stairs (Locomotion)   [place] Level (Gait) contact guard   Assistive Device (Gait) walker, front-wheeled   Distance in Feet (Gait) 10'   Pattern (Gait) step-through   Deviations/Abnormal Patterns (Gait) antalgic;demarco decreased;gait speed decreased;stride length decreased;weight shifting decreased   Maintains Weight-bearing Status (Gait) able to maintain   Balance   Balance Comments impaired dynamic balance but able to amb with FWW and asst x 1   Sensory Examination   Sensory Perception Comments mild post surgical numbness in LLE   Clinical Impression   Criteria for Skilled Therapeutic Intervention Yes, treatment indicated   PT Diagnosis (PT) impaired gait   Influenced by the following impairments pain, deficits with ROM, strength, balance   Functional limitations due to impairments decreased ind with functional moblity, walking, stairs, falls risk   Clinical Presentation (PT Evaluation Complexity) stable   Clinical Presentation Rationale PMH and clinical judgement   Clinical Decision Making (Complexity) low complexity   Planned Therapy Interventions (PT) balance  training;bed mobility training;cryotherapy;gait training;home exercise program;patient/family education;ROM (range of motion);stair training;strengthening;stretching;transfer training;progressive activity/exercise   Risk & Benefits of therapy have been explained evaluation/treatment results reviewed;risks/benefits reviewed;care plan/treatment goals reviewed;current/potential barriers reviewed;participants voiced agreement with care plan;participants included;patient   PT Total Evaluation Time   PT Eval, Low Complexity Minutes (82605) 10   Therapy Certification   Start of care date 11/19/24   Certification date from 11/19/24   Certification date to 11/26/24   Medical Diagnosis L TKA   Physical Therapy Goals   PT Frequency 2x/day   PT Predicted Duration/Target Date for Goal Attainment 11/20/24   PT Goals Bed Mobility;Transfers;Gait;Stairs   PT: Bed Mobility Independent;Supine to/from sit   PT: Transfers Supervision/stand-by assist;Sit to/from stand;Assistive device   PT: Gait Supervision/stand-by assist;Rolling walker;150 feet   PT: Stairs Minimal assist;8 stairs;Rail on left   Interventions   Interventions Quick Adds Gait Training;Therapeutic Activity;Therapeutic Procedure   Therapeutic Procedure/Exercise   Ther. Procedure: strength, endurance, ROM, flexibillity Minutes (70786) 7   Symptoms Noted During/After Treatment increased pain   Treatment Detail/Skilled Intervention Educated on post surgical benefits of TE on circulation, functional ROM, and strength. Left pt with TE handout. With pt in supine cued for AP's x 20, on LLE: quad sets x 10, heel slides x 10, SLR x 10, hamstring sets x 5. Pt tolerated all TE well   Therapeutic Activity   Therapeutic Activities: dynamic activities to improve functional performance Minutes (99804) 12   Symptoms Noted During/After Treatment Increased pain   Treatment Detail/Skilled Intervention Greeted pt supine in bed. Eval completed. Educated on orders for WBAT, ROM to flexion  tolerance, and importance of keeping knee straight when resting and demonstrated how to do so. Also educated on importance of walking program and how to balance rest with activity.  SBA with bed mobility with min cueing needed. Pt able to reposition in bed IND. Performed Sit<>stand with FWW CGA/SBA, cues for safe walker and hand placement and pt able to demo back well. Pt stood in room with FWW and SBA to void in urinal, pt demonstrated good static standing balance. Increased time for line management and room set up   Gait Training   Gait Training Minutes (64287) 8   Symptoms Noted During/After Treatment (Gait Training) fatigue;increased pain   Treatment Detail/Skilled Intervention Pt amb into hallway ~150' with FWW and CGA progressing to SBA. Slow gait speed and demarco, Step-through pattern, cues provided for even step lengths. Pt uses crouched gait, cued several times for upright posture, heel-to-to pattern and to straigthen knee during heelstrike, minimal carry-over but slightly improved. Cues for safe walker placement   Distance in Feet 150'   PT Discharge Planning   PT Plan review/progress HEP, progress gait with FWW, trial steps   PT Discharge Recommendation (DC Rec)   (defer to ortho)   PT Rationale for DC Rec Pt below baseline but moving well and using safe technique, able to amb with FWW and SBA. Anticipate pt will progress and by discharge be at/near IND bed mobility, SBA transfers with FWW, SBA amb with FWW, Yash for steps. Pt will have good 24/7 support at home from, spouse, adult son who lives nearby   PT Brief overview of current status SBA with FWW - Goals of therapy will be to address safe mobility and make recs for d/c to next level of care. Pt and RN will continue to follow all falls risk precautions as documented by RN staff while hospitalized   Physical Therapy Time and Intention   Timed Code Treatment Minutes 27   Total Session Time (sum of timed and untimed services) 37       [facility]  Rehabilitation Services  OUTPATIENT PHYSICAL THERAPY EVALUATION  PLAN OF TREATMENT FOR OUTPATIENT REHABILITATION  (COMPLETE FOR INITIAL CLAIMS ONLY)  Patient's Last Name, First Name, M.I.  YOB: 1945  Sylvain Moss                        Provider's Name  [facility] Medical Record No.  [id number]                             Onset Date:  11/19/24   Start of Care Date:  11/19/24   Type:     _X_PT   ___OT   ___SLP Medical Diagnosis:  L TKA              PT Diagnosis:  impaired gait Visits from SOC:  1     See note for plan of treatment, functional goals and certification details    I CERTIFY THE NEED FOR THESE SERVICES FURNISHED UNDER        THIS PLAN OF TREATMENT AND WHILE UNDER MY CARE     (Physician co-signature of this document indicates review and certification of the therapy plan).

## 2024-11-20 NOTE — PROGRESS NOTES
Patient vital signs are at baseline: yes  Patient able to ambulate as they were prior to admission or with assist devices provided by therapies during their stay:  yes  Patient MUST void prior to discharge:  Yes  Patient able to tolerate oral intake:  Yes  Pain has adequate pain control using Oral analgesics:  Yes  Does patient have an identified :  Yes  Has goal D/C date and time been discussed with patient:  Yes  Alert and oriented x 4. CMS intact. Dressing to LLE intact.

## 2024-11-20 NOTE — PROGRESS NOTES
[facility]    Medicine Progress Note - Hospitalist Service    Date of Admission:  11/19/2024    Assessment & Plan   Sylvain Moss is a 78 year old male with PMH significant for hypertension, hyperlipidemia, anemia, hypothyroidism, bipolar affective disorder, BPH, among others who was admitted to [facility] on 11/19/2024 by the orthopedics service for elective left TKA.     Left total knee arthroplasty 11/19/2024   Surgery with Dr. Chris Gamboa. GETA. EBL less than 50 mL. No immediate postoperative complications however noted some transient soft blood pressures with systolic in the 90s.  - Routine postoperative cares per primary service, including IVF, pain control, abx, DVT ppx, and disposition  - PT/OT as per primary service  - Aggressive pulmonary toilet, frequent IS use 10x/hour while awake  - Check Hgb postoperative day #1, Baseline Hgb 12.6 in the setting of chronic normocytic anemia    Transient blood pressures perioperatively  -sodium low-but stable at 130. Will hold hydrochlorothiazide at discharge  -can continue lisinopril at discharge b/p 128/67    Normocytic anemia  Minimal blood loss less than 50 mL.  Preop hemoglobin 12.6, patient reports he has been taking iron PTA and they have been monitoring hemoglobin levels.  -Recheck hemoglobin postoperative day 1 stable 11  -Hold PTA meloxicam    Benign essential hypertension  History of hyponatremia  PTA regimen: Hydrochlorothiazide 25 mg daily, lisinopril 40 mg daily  Transient soft blood pressures perioperatively.  History of hyponatremia noted sodium 120 7/2024 and 135 in July in Care Everywhere.  - discontinue hydrochlorothiazide. Continue lisinopril at discharge   - PRN IV hydralazine available for SBP >180    History of BPH with retention  -Resume PTA finasteride 5 mg daily    Hypothyroidism: Continue PTA levothyroxine 50 mcg daily    Bipolar affective disorder: Not presently on medications PTA,  "stable    Hyperlipidemia: Continue PTA atorvastatin 20 mg nightly    History of sinus bradycardia  Noted on preop EKG, heart rate 55, first-degree AV block.  - Monitor as needed and if ongoing bradycardia could consider adding cardiac monitor/EKG however no acute need at this juncture.    Glaucoma: Continue PTA drops    Restless leg syndrome: Continue PTA Mirapex    Obese class II  Body mass index is 37.45 kg/m .  Increases all cause mortality.  -Follow-up with primary care provider after recovery            Diet: Advance Diet as Tolerated: Regular Diet Adult  Discharge Instruction - Regular Diet Adult    DVT Prophylaxis: Defer to primary service  Pleitez Catheter: Not present  Lines: None     Cardiac Monitoring: None  Code Status: Full Code      Clinically Significant Risk Factors Present on Admission         # Hyponatremia: Lowest Na = 134 mmol/L in last 2 days, will monitor as appropriate         # Drug Induced Platelet Defect: home medication list includes an antiplatelet medication   # Hypertension: Home medication list includes antihypertensive(s)           # Obesity: Estimated body mass index is 37.45 kg/m  as calculated from the following:    Height as of this encounter: 1.88 m (6' 2\").    Weight as of this encounter: 132.3 kg (291 lb 11.2 oz).              Social Drivers of Health            Disposition Plan     Medically Ready for Discharge: Anticipated Today           The patient's care was discussed with the Attending Physician, Dr. moran .    Claudia Wang, OPAL APRN [facility]  Hospitalist Service  [facility]  Securely message with [employer] (more info)  Text page via [facility] Paging/Directory   ______________________________________________________________________    Interval History   This morning, patient is seen in his room with therapy at bedside patient is alert, oriented, without distress noted. He denies any chest pain, shortness of breath. He denies nausea. He had an episode of " one bloody sputum with a cough, small. No fever, chills.     Physical Exam   Vital Signs: Temp: 99.2  F (37.3  C) Temp src: Oral BP: 136/74 Pulse: 81   Resp: 16 SpO2: 95 % O2 Device: None (Room air) Oxygen Delivery: 2 LPM  Weight: 291 lbs 11.2 oz    Physical Exam  Constitutional:       Appearance: Normal appearance. He is obese.   HENT:      Head: Normocephalic.      Mouth/Throat:      Mouth: Mucous membranes are moist.   Eyes:      Pupils: Pupils are equal, round, and reactive to light.   Skin:     General: Skin is warm and dry.      Comments: Left leg covered with dressing/ace wrap CDI    Neurological:      General: No focal deficit present.      Mental Status: He is alert and oriented to person, place, and time.   Psychiatric:         Mood and Affect: Mood normal.          Medical Decision Making       42 MINUTES SPENT BY ME on the date of service doing chart review, history, exam, documentation & further activities per the note.      Data     I have personally reviewed the following data over the past 24 hrs:    N/A  \   11.0 (L)   / N/A     130 (L) 96 (L) 20.6 /  117 (H); 119 (H)   4.3 25 1.05 \       Imaging results reviewed over the past 24 hrs:   Recent Results (from the past 24 hours)   XR Knee Port Left 1/2 Views    Narrative    EXAM: XR KNEE PORT LEFT 1/2 VIEWS  DATE/TIME: 11/19/2024 2:06 PM    INDICATION: Post-Op Total Knee  COMPARISON: No direct comparisons available.      Impression    IMPRESSION: Left total knee arthroplasty. Negative for postoperative  purposes.       MCKAYLA QIU DO         SYSTEM ID:  [id number]

## 2024-11-20 NOTE — PROGRESS NOTES
Patient vital signs are at baseline: Yes  Patient able to ambulate as they were prior to admission or with assist devices provided by therapies during their stay:  Yes  Patient MUST void prior to discharge:  Yes  Patient able to tolerate oral intake:  Yes  Pain has adequate pain control using Oral analgesics:  Yes  Does patient have an identified :  Yes  Has goal D/C date and time been discussed with patient:  Yes    Patient  A&O x4. CMS and vitals intact. LLE dressing changed CDI. Denied any pain. Discharge medication given to patient, questions answered, medication reviewed/performed teachback, and belongs returned to patient. Discharged home with family.

## 2024-11-20 NOTE — ADDENDUM NOTE
Addendum  created 11/20/24 0814 by Humberto Page MD    Child order released for a procedure order, Clinical Note Signed, Intraprocedure Blocks edited, Order Canceled from Note, SmartForm saved

## 2024-11-20 NOTE — PLAN OF CARE
Occupational Therapy: Orders received. Chart reviewed and discussed with care team, including IP PT. Pt demonstrates no concerns for IP OT and ADLs, pt to have assist upon discharge as needed. All therapy needs are being met with IP PT. Will complete orders.

## 2024-11-20 NOTE — PROGRESS NOTES
DX: POD 1 L total knee arthroplasty    Patient vital signs are at baseline: yes, room air  Patient able to ambulate as they were prior to admission or with assist devices provided by therapies during their stay:  yes  Patient MUST void prior to discharge:  Yes  Patient able to tolerate oral intake:  Yes  Pain has adequate pain control using Oral analgesics:  Yes  Does patient have an identified :  Yes  Has goal D/C date and time been discussed with patient:  Yes    Alert and oriented x 4. CPAP at HS. CMS intact. Dressing to LLE intact. Denied pain.  Assist of 1 GB/walker.  Voided several times in urinal. Plan to discharge home today.

## 2024-11-20 NOTE — PROGRESS NOTES
.Patient vital signs are at baseline: Yes  Patient able to ambulate as they were prior to admission or with assist devices provided by therapies during their stay:  Yes  Patient MUST void prior to discharge:  Yes  Patient able to tolerate oral intake:  Yes  Pain has adequate pain control using Oral analgesics:  Yes  Does patient have an identified :  Yes  Has goal D/C date and time been discussed with patient:  Yes     Patient A&OX4, VSS on 2L N[place], sat @ 96%. Minimal Pain tylenol. Dressing CDI, Up with A1& walker. Voided on urinal 200ml. PVR 500ml & Patient want to try one time before  straight cath.Regular diet. Tolerated well. Continue plan of care.

## 2024-11-20 NOTE — ANESTHESIA PROCEDURE NOTES
"Intrathecal Procedure Note    Pre-Procedure   Staff -        Anesthesiologist:  Humberto Page MD       Performed By: anesthesiologist       Location: OR       Pre-Anesthestic Checklist: patient identified, IV checked, risks and benefits discussed, informed consent, monitors and equipment checked and pre-op evaluation  Timeout:       Correct Patient: Yes        Correct Procedure: Yes        Correct Site: Yes        Correct Position: Yes   Procedure Documentation  Procedure: intrathecal       Patient Position: sitting       Skin prep: Betadine       Insertion Site: L4-5. (midline approach).       Needle Gauge: 24.        Needle Length (Inches): 3.5        Spinal Needle Type: Pencan       Introducer used       Introducer: 20 G       # of attempts: 1 and  # of redirects:     Assessment/Narrative         Paresthesias: No.       CSF fluid: clear.    Medication(s) Administered   0.75% Hyperbaric Bupivacaine (Intrathecal) - Intrathecal   1.8 mL - 11/19/2024 11:05:00 AM   Comments:  1% lidocaine for localization.  No complications.        FOR [facility] ([facility] [place]:   Pain Team Contact information: please page the Pain Team Via [employer]. Search \"Pain\". During daytime hours, please page the attending first. At night please page the resident first.      "

## 2024-11-20 NOTE — PROVIDER NOTIFICATION
.MD Notification    Notified Person: Hospitalist PA    Notified Person Name: Pam    Notification Date/Time: 11/20/24 11:50    Notification Interaction: page    Purpose of Notification: noticed bloody sputum    Orders Received: NO    Comments: Provider called back and stated to let the pt. Know to notify provider if it happened again when he goes home. Will follow the recommendation.

## 2024-11-21 NOTE — PLAN OF CARE
PT-  Pt discharged home today with assist of spouse and son as needed and OP PT.   PT goals partially met.

## 2025-06-14 ENCOUNTER — HEALTH MAINTENANCE LETTER (OUTPATIENT)
Age: 80
End: 2025-06-14

## 2025-07-22 ENCOUNTER — TELEPHONE (OUTPATIENT)
Dept: UROLOGY | Facility: CLINIC | Age: 80
End: 2025-07-22
Payer: COMMERCIAL

## 2025-07-22 NOTE — TELEPHONE ENCOUNTER
----- Message from Yue FLANNERY sent at 7/21/2025  8:37 AM CDT -----  Hi, Ladies  Can you schedule this patient an appointment, he is overdue to be seen and pharmacy is requesting a refill. Please send to [facility] once appointment is made, to see if refill can be made on Finasteride 5mg tablets to [facility] in [place].   Thanks!  ~Yue

## (undated) DEVICE — SU VICRYL 0 CP-1 27" J467H

## (undated) DEVICE — DRSG XEROFORM 5X9" 8884431605

## (undated) DEVICE — DRAPE SHEET REV FOLD 3/4 9349

## (undated) DEVICE — MANIFOLD NEPTUNE 4 PORT 700-20

## (undated) DEVICE — NDL SPINAL 18GA 3.5" 405184

## (undated) DEVICE — SYR 50ML LL W/O NDL 309653

## (undated) DEVICE — CAST PADDING 6" UNSTERILE 9046

## (undated) DEVICE — DECANTER BAG 2002S

## (undated) DEVICE — ESU GROUND PAD UNIVERSAL W/O CORD

## (undated) DEVICE — BONE CEMENT MIXEVAC III HI VAC KIT  0206-015-000

## (undated) DEVICE — BLADE SAW SAGITTAL THK1.13 MM L90 MM X W18 MM 4118-127-090

## (undated) DEVICE — SOLUTION WOUND CLEANSING 3/4OZ 10% PVP EA-L3011FB-50

## (undated) DEVICE — SOL WATER IRRIG 1000ML BOTTLE 2F7114

## (undated) DEVICE — WRAP EZY KNEE 1213PP

## (undated) DEVICE — DRSG KERLIX FLUFFS X5

## (undated) DEVICE — PACK TOTAL KNEE SOP15TKFSD

## (undated) DEVICE — DRAPE STERI U 1015

## (undated) DEVICE — GLOVE BIOGEL PI SZ 7.5 40875

## (undated) DEVICE — GLOVE BIOGEL PI MICRO INDICATOR UNDERGLOVE SZ 6.5 48965

## (undated) DEVICE — DRAIN ROUND W/RESERV KIT JACKSON PRATT 10FR 400ML SU130-402D

## (undated) DEVICE — SOL NACL 0.9% IRRIG 1000ML BOTTLE 2F7124

## (undated) DEVICE — SUCTION TIP YANKAUER W/O VENT K86

## (undated) DEVICE — BLADE SAW SAGITTAL STRK 25X90X1.37MM 4H SYS 6 6125-137-090

## (undated) DEVICE — SOL NACL 0.9% INJ 1000ML BAG 07983-09

## (undated) DEVICE — GLOVE BIOGEL PI MICRO INDICATOR UNDERGLOVE SZ 8.0 48980

## (undated) DEVICE — SU ETHIBOND 0 CTX CR  8X18" CX31D

## (undated) DEVICE — BONE CLEANING TIP INTERPULSE  0210-010-000

## (undated) DEVICE — GLOVE BIOGEL PI SZ 7.0 40870

## (undated) DEVICE — CAST PADDING 6" STERILE 9046S

## (undated) DEVICE — SOL NACL 0.9% IRRIG 3000ML BAG 2B7477

## (undated) DEVICE — STPL SKIN 35W 6.9MM  PXW35

## (undated) DEVICE — LINEN TOWEL PACK X5 5464

## (undated) DEVICE — SU VICRYL 2-0 CP-1 27" UND J266H

## (undated) DEVICE — SUCTION IRR SYSTEM W/O TIP INTERPULSE HANDPIECE 0210-100-000

## (undated) DEVICE — DRSG ABDOMINAL 07 1/2X8" 7197D

## (undated) DEVICE — HOOD SURG T7PLUS PEEL AWAY FACE SHIELD STRL LF 0416-801-100

## (undated) DEVICE — PREP SKIN SCRUB TRAY 4461A

## (undated) RX ORDER — DOBUTAMINE HYDROCHLORIDE 200 MG/100ML
INJECTION INTRAVENOUS
Status: DISPENSED
Start: 2024-03-14

## (undated) RX ORDER — VANCOMYCIN HYDROCHLORIDE 1 G/20ML
INJECTION, POWDER, LYOPHILIZED, FOR SOLUTION INTRAVENOUS
Status: DISPENSED
Start: 2024-04-16

## (undated) RX ORDER — CEFAZOLIN SODIUM/WATER 3 G/30 ML
SYRINGE (ML) INTRAVENOUS
Status: DISPENSED
Start: 2024-04-16

## (undated) RX ORDER — TRANEXAMIC ACID 650 MG/1
TABLET ORAL
Status: DISPENSED
Start: 2024-11-19

## (undated) RX ORDER — EPHEDRINE SULFATE 50 MG/ML
INJECTION, SOLUTION INTRAMUSCULAR; INTRAVENOUS; SUBCUTANEOUS
Status: DISPENSED
Start: 2024-11-19

## (undated) RX ORDER — PROPOFOL 10 MG/ML
INJECTION, EMULSION INTRAVENOUS
Status: DISPENSED
Start: 2024-04-16

## (undated) RX ORDER — PROPOFOL 10 MG/ML
INJECTION, EMULSION INTRAVENOUS
Status: DISPENSED
Start: 2024-11-19

## (undated) RX ORDER — TRANEXAMIC ACID 650 MG/1
TABLET ORAL
Status: DISPENSED
Start: 2024-04-16

## (undated) RX ORDER — METOPROLOL TARTRATE 1 MG/ML
INJECTION, SOLUTION INTRAVENOUS
Status: DISPENSED
Start: 2024-03-14

## (undated) RX ORDER — ATROPINE SULFATE 0.4 MG/ML
AMPUL (ML) INJECTION
Status: DISPENSED
Start: 2024-03-14

## (undated) RX ORDER — CEFAZOLIN SODIUM 1 G/3ML
INJECTION, POWDER, FOR SOLUTION INTRAMUSCULAR; INTRAVENOUS
Status: DISPENSED
Start: 2024-11-19

## (undated) RX ORDER — DEXAMETHASONE SODIUM PHOSPHATE 4 MG/ML
INJECTION, SOLUTION INTRA-ARTICULAR; INTRALESIONAL; INTRAMUSCULAR; INTRAVENOUS; SOFT TISSUE
Status: DISPENSED
Start: 2024-04-16

## (undated) RX ORDER — ONDANSETRON 2 MG/ML
INJECTION INTRAMUSCULAR; INTRAVENOUS
Status: DISPENSED
Start: 2024-04-16